# Patient Record
Sex: FEMALE | Race: WHITE | NOT HISPANIC OR LATINO | Employment: OTHER | URBAN - METROPOLITAN AREA
[De-identification: names, ages, dates, MRNs, and addresses within clinical notes are randomized per-mention and may not be internally consistent; named-entity substitution may affect disease eponyms.]

---

## 2022-07-27 LAB
CREAT ?TM UR-SCNC: 77.4 UMOL/L
EXT MICROALBUMIN URINE RANDOM: 5.3
HBA1C MFR BLD HPLC: 7.6 %
MICROALBUMIN/CREAT UR: 68.5 MG/G{CREAT}

## 2022-09-12 ENCOUNTER — OFFICE VISIT (OUTPATIENT)
Dept: ENDOCRINOLOGY | Age: 87
End: 2022-09-12
Payer: COMMERCIAL

## 2022-09-12 VITALS
TEMPERATURE: 98 F | HEART RATE: 64 BPM | WEIGHT: 181.2 LBS | OXYGEN SATURATION: 93 % | SYSTOLIC BLOOD PRESSURE: 120 MMHG | BODY MASS INDEX: 33.34 KG/M2 | HEIGHT: 62 IN | DIASTOLIC BLOOD PRESSURE: 62 MMHG

## 2022-09-12 DIAGNOSIS — M80.00XA OSTEOPOROSIS WITH CURRENT PATHOLOGICAL FRACTURE, UNSPECIFIED OSTEOPOROSIS TYPE, INITIAL ENCOUNTER: ICD-10-CM

## 2022-09-12 DIAGNOSIS — E11.65 INADEQUATELY CONTROLLED DIABETES MELLITUS (HCC): Primary | ICD-10-CM

## 2022-09-12 DIAGNOSIS — E78.2 MIXED HYPERLIPIDEMIA: ICD-10-CM

## 2022-09-12 DIAGNOSIS — I10 HYPERTENSION, UNSPECIFIED TYPE: ICD-10-CM

## 2022-09-12 DIAGNOSIS — E03.9 PRIMARY HYPOTHYROIDISM: ICD-10-CM

## 2022-09-12 DIAGNOSIS — N18.31 STAGE 3A CHRONIC KIDNEY DISEASE (HCC): ICD-10-CM

## 2022-09-12 PROCEDURE — 99214 OFFICE O/P EST MOD 30 MIN: CPT | Performed by: INTERNAL MEDICINE

## 2022-09-12 RX ORDER — ATENOLOL 25 MG/1
TABLET ORAL
COMMUNITY
Start: 2022-09-05

## 2022-09-12 RX ORDER — GLIPIZIDE 5 MG/1
5 TABLET ORAL 2 TIMES DAILY
COMMUNITY
Start: 2022-06-17

## 2022-09-12 RX ORDER — FENOFIBRATE 145 MG/1
145 TABLET, COATED ORAL DAILY
COMMUNITY
Start: 2022-08-06

## 2022-09-12 RX ORDER — LEVOTHYROXINE SODIUM 137 UG/1
137 TABLET ORAL DAILY
Qty: 90 TABLET | Refills: 2 | Status: SHIPPED | OUTPATIENT
Start: 2022-09-12 | End: 2022-12-11

## 2022-09-12 RX ORDER — ATORVASTATIN CALCIUM 80 MG/1
80 TABLET, FILM COATED ORAL DAILY
COMMUNITY
Start: 2022-08-02

## 2022-09-12 RX ORDER — LEVOTHYROXINE SODIUM 0.12 MG/1
125 TABLET ORAL DAILY
COMMUNITY
Start: 2022-07-03 | End: 2022-09-12

## 2022-09-12 RX ORDER — DAPAGLIFLOZIN 10 MG/1
10 TABLET, FILM COATED ORAL DAILY
COMMUNITY
Start: 2022-08-02

## 2022-09-12 RX ORDER — LEVOTHYROXINE SODIUM 112 UG/1
112 TABLET ORAL DAILY
COMMUNITY
Start: 2022-07-28 | End: 2022-09-12 | Stop reason: DRUGHIGH

## 2022-09-12 RX ORDER — DULAGLUTIDE 0.75 MG/.5ML
INJECTION, SOLUTION SUBCUTANEOUS WEEKLY
COMMUNITY
Start: 2022-07-25

## 2022-09-12 RX ORDER — ALLOPURINOL 100 MG/1
100 TABLET ORAL DAILY
COMMUNITY
Start: 2022-08-05

## 2022-09-12 RX ORDER — CANDESARTAN 8 MG/1
8 TABLET ORAL DAILY
COMMUNITY
Start: 2022-08-09

## 2022-09-12 NOTE — PATIENT INSTRUCTIONS
All meds same  I changed levothyroxine dose from 125 mcg daily to 137 mcg tab one daily  Need to see a kidney specialist  Return for a visit in 3 months and do blood test before coming  Bring all your meds next time
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
## 2022-10-19 ENCOUNTER — TELEPHONE (OUTPATIENT)
Dept: ENDOCRINOLOGY | Age: 87
End: 2022-10-19

## 2022-11-30 ENCOUNTER — TELEPHONE (OUTPATIENT)
Dept: NEPHROLOGY | Facility: CLINIC | Age: 87
End: 2022-11-30

## 2022-11-30 NOTE — TELEPHONE ENCOUNTER
I called and left message on patients answering machine for patient to return our call about scheduling a Nephrology [facility] Appointment Ref by Dr Natasha Luna for Stage 3a chronic kidney disease

## 2022-12-02 ENCOUNTER — TELEPHONE (OUTPATIENT)
Dept: NEPHROLOGY | Facility: CLINIC | Age: 87
End: 2022-12-02

## 2022-12-02 NOTE — TELEPHONE ENCOUNTER
I called and spoke with patient sister   Patients sister stated patient is having surgery on the 13 of December and at this time patient is unable to schedule xi with the Nephrologist  Referral will be close

## 2022-12-08 ENCOUNTER — TELEPHONE (OUTPATIENT)
Dept: ENDOCRINOLOGY | Age: 87
End: 2022-12-08

## 2022-12-08 NOTE — TELEPHONE ENCOUNTER
Patient's sister called - says patient had bloodwork done at [facility] earlier this week for surgery scheduled on Tue , 12/13  Bloodwork we ordered has not been done  I tried to call lab to see if an A1C was done - left a message

## 2022-12-08 NOTE — TELEPHONE ENCOUNTER
Left message for patient to remind her to do her bloodwork before her appt on Monday and to confirm

## 2022-12-12 ENCOUNTER — OFFICE VISIT (OUTPATIENT)
Dept: ENDOCRINOLOGY | Age: 87
End: 2022-12-12

## 2022-12-12 VITALS
OXYGEN SATURATION: 96 % | BODY MASS INDEX: 32.54 KG/M2 | HEART RATE: 67 BPM | DIASTOLIC BLOOD PRESSURE: 52 MMHG | WEIGHT: 176.8 LBS | TEMPERATURE: 97.8 F | SYSTOLIC BLOOD PRESSURE: 118 MMHG | HEIGHT: 62 IN

## 2022-12-12 DIAGNOSIS — E11.65 INADEQUATELY CONTROLLED DIABETES MELLITUS (HCC): Primary | ICD-10-CM

## 2022-12-12 DIAGNOSIS — E78.2 MIXED HYPERLIPIDEMIA: ICD-10-CM

## 2022-12-12 DIAGNOSIS — N18.31 STAGE 3A CHRONIC KIDNEY DISEASE (HCC): ICD-10-CM

## 2022-12-12 DIAGNOSIS — E03.9 PRIMARY HYPOTHYROIDISM: ICD-10-CM

## 2022-12-12 DIAGNOSIS — I10 HYPERTENSION, UNSPECIFIED TYPE: ICD-10-CM

## 2022-12-12 RX ORDER — DULAGLUTIDE 0.75 MG/.5ML
0.75 INJECTION, SOLUTION SUBCUTANEOUS WEEKLY
Qty: 6 ML | Refills: 2 | Status: SHIPPED | OUTPATIENT
Start: 2022-12-12 | End: 2023-03-12

## 2022-12-12 RX ORDER — LEVOTHYROXINE SODIUM 137 UG/1
137 TABLET ORAL DAILY
Qty: 90 TABLET | Refills: 2 | Status: SHIPPED | OUTPATIENT
Start: 2022-12-12 | End: 2023-03-12

## 2022-12-12 RX ORDER — GLIPIZIDE 5 MG/1
5 TABLET ORAL 2 TIMES DAILY
Qty: 180 TABLET | Refills: 2 | Status: SHIPPED | OUTPATIENT
Start: 2022-12-12 | End: 2023-03-12

## 2022-12-12 RX ORDER — SULFAMETHOXAZOLE AND TRIMETHOPRIM 800; 160 MG/1; MG/1
TABLET ORAL
COMMUNITY
Start: 2022-12-01 | End: 2022-12-12
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
## 2023-03-29 ENCOUNTER — APPOINTMENT (OUTPATIENT)
Dept: LAB | Facility: CLINIC | Age: 88
End: 2023-03-29

## 2023-03-29 DIAGNOSIS — N18.32 CHRONIC KIDNEY DISEASE (CKD) STAGE G3B/A1, MODERATELY DECREASED GLOMERULAR FILTRATION RATE (GFR) BETWEEN 30-44 ML/MIN/1.73 SQUARE METER AND ALBUMINURIA CREATININE RATIO LESS THAN 30 MG/G (HCC): ICD-10-CM

## 2023-03-29 DIAGNOSIS — E03.9 ACQUIRED HYPOTHYROIDISM: ICD-10-CM

## 2023-03-29 DIAGNOSIS — E11.65 INADEQUATELY CONTROLLED DIABETES MELLITUS (HCC): ICD-10-CM

## 2023-03-29 DIAGNOSIS — E11.9 TYPE 2 DIABETES MELLITUS WITHOUT COMPLICATION, WITHOUT LONG-TERM CURRENT USE OF INSULIN (HCC): ICD-10-CM

## 2023-03-29 DIAGNOSIS — M1A.00X0 CHRONIC GOUTY ARTHRITIS: ICD-10-CM

## 2023-03-29 LAB
ALBUMIN SERPL BCP-MCNC: 3.4 G/DL (ref 3.5–5)
ALP SERPL-CCNC: 45 U/L (ref 46–116)
ALT SERPL W P-5'-P-CCNC: 25 U/L (ref 12–78)
ANION GAP SERPL CALCULATED.3IONS-SCNC: 4 MMOL/L (ref 4–13)
AST SERPL W P-5'-P-CCNC: 18 U/L (ref 5–45)
BILIRUB SERPL-MCNC: 0.56 MG/DL (ref 0.2–1)
BUN SERPL-MCNC: 27 MG/DL (ref 5–25)
CALCIUM ALBUM COR SERPL-MCNC: 10.1 MG/DL (ref 8.3–10.1)
CALCIUM SERPL-MCNC: 9.6 MG/DL (ref 8.3–10.1)
CHLORIDE SERPL-SCNC: 106 MMOL/L (ref 96–108)
CHOLEST SERPL-MCNC: 102 MG/DL
CO2 SERPL-SCNC: 25 MMOL/L (ref 21–32)
CREAT SERPL-MCNC: 1.08 MG/DL (ref 0.6–1.3)
CREAT UR-MCNC: 71.5 MG/DL
EST. AVERAGE GLUCOSE BLD GHB EST-MCNC: 151 MG/DL
GFR SERPL CREATININE-BSD FRML MDRD: 44 ML/MIN/1.73SQ M
GLUCOSE P FAST SERPL-MCNC: 167 MG/DL (ref 65–99)
HBA1C MFR BLD: 6.9 %
HDLC SERPL-MCNC: 32 MG/DL
LDLC SERPL CALC-MCNC: 42 MG/DL (ref 0–100)
MICROALBUMIN UR-MCNC: 30.6 MG/L (ref 0–20)
MICROALBUMIN/CREAT 24H UR: 43 MG/G CREATININE (ref 0–30)
NONHDLC SERPL-MCNC: 70 MG/DL
POTASSIUM SERPL-SCNC: 4.2 MMOL/L (ref 3.5–5.3)
PROT SERPL-MCNC: 6.6 G/DL (ref 6.4–8.4)
SODIUM SERPL-SCNC: 135 MMOL/L (ref 135–147)
T4 FREE SERPL-MCNC: 1.52 NG/DL (ref 0.76–1.46)
TRIGL SERPL-MCNC: 141 MG/DL
TSH SERPL DL<=0.05 MIU/L-ACNC: 7.15 UIU/ML (ref 0.45–4.5)
URATE SERPL-MCNC: 4.8 MG/DL (ref 2–7.5)

## 2023-07-27 ENCOUNTER — APPOINTMENT (OUTPATIENT)
Dept: LAB | Facility: CLINIC | Age: 88
End: 2023-07-27
Payer: COMMERCIAL

## 2023-07-27 DIAGNOSIS — E03.9 PRIMARY HYPOTHYROIDISM: ICD-10-CM

## 2023-07-27 DIAGNOSIS — E11.65 INADEQUATELY CONTROLLED DIABETES MELLITUS (HCC): ICD-10-CM

## 2023-07-27 LAB
ALBUMIN SERPL BCP-MCNC: 3.3 G/DL (ref 3.5–5)
ALP SERPL-CCNC: 45 U/L (ref 46–116)
ALT SERPL W P-5'-P-CCNC: 27 U/L (ref 12–78)
ANION GAP SERPL CALCULATED.3IONS-SCNC: 4 MMOL/L
AST SERPL W P-5'-P-CCNC: 26 U/L (ref 5–45)
BILIRUB SERPL-MCNC: 0.95 MG/DL (ref 0.2–1)
BUN SERPL-MCNC: 37 MG/DL (ref 5–25)
CALCIUM ALBUM COR SERPL-MCNC: 10 MG/DL (ref 8.3–10.1)
CALCIUM SERPL-MCNC: 9.4 MG/DL (ref 8.3–10.1)
CHLORIDE SERPL-SCNC: 109 MMOL/L (ref 96–108)
CO2 SERPL-SCNC: 25 MMOL/L (ref 21–32)
CREAT SERPL-MCNC: 1.3 MG/DL (ref 0.6–1.3)
EST. AVERAGE GLUCOSE BLD GHB EST-MCNC: 192 MG/DL
GFR SERPL CREATININE-BSD FRML MDRD: 35 ML/MIN/1.73SQ M
GLUCOSE P FAST SERPL-MCNC: 227 MG/DL (ref 65–99)
HBA1C MFR BLD: 8.3 %
POTASSIUM SERPL-SCNC: 4.3 MMOL/L (ref 3.5–5.3)
PROT SERPL-MCNC: 6.6 G/DL (ref 6.4–8.4)
SODIUM SERPL-SCNC: 138 MMOL/L (ref 135–147)
TSH SERPL DL<=0.05 MIU/L-ACNC: 10.89 UIU/ML (ref 0.45–4.5)

## 2023-07-27 PROCEDURE — 84443 ASSAY THYROID STIM HORMONE: CPT

## 2023-07-27 PROCEDURE — 36415 COLL VENOUS BLD VENIPUNCTURE: CPT

## 2023-07-27 PROCEDURE — 80053 COMPREHEN METABOLIC PANEL: CPT

## 2023-07-27 PROCEDURE — 83036 HEMOGLOBIN GLYCOSYLATED A1C: CPT

## 2023-08-01 ENCOUNTER — OFFICE VISIT (OUTPATIENT)
Age: 88
End: 2023-08-01
Payer: COMMERCIAL

## 2023-08-01 VITALS
SYSTOLIC BLOOD PRESSURE: 116 MMHG | BODY MASS INDEX: 32.06 KG/M2 | WEIGHT: 174.2 LBS | HEIGHT: 62 IN | DIASTOLIC BLOOD PRESSURE: 68 MMHG

## 2023-08-01 DIAGNOSIS — E03.9 PRIMARY HYPOTHYROIDISM: ICD-10-CM

## 2023-08-01 DIAGNOSIS — E11.65 INADEQUATELY CONTROLLED DIABETES MELLITUS (HCC): Primary | ICD-10-CM

## 2023-08-01 PROCEDURE — 99214 OFFICE O/P EST MOD 30 MIN: CPT | Performed by: INTERNAL MEDICINE

## 2023-08-01 RX ORDER — DAPAGLIFLOZIN 10 MG/1
10 TABLET, FILM COATED ORAL DAILY
COMMUNITY
Start: 2023-07-26

## 2023-08-01 RX ORDER — LEVOTHYROXINE SODIUM 137 UG/1
TABLET ORAL
Qty: 90 TABLET | Refills: 2 | Status: SHIPPED | OUTPATIENT
Start: 2023-08-01

## 2023-08-01 RX ORDER — LEVOTHYROXINE SODIUM 0.15 MG/1
TABLET ORAL
Qty: 90 TABLET | Refills: 1 | Status: SHIPPED | OUTPATIENT
Start: 2023-08-01
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
## 2023-09-11 DIAGNOSIS — E11.65 INADEQUATELY CONTROLLED DIABETES MELLITUS (HCC): ICD-10-CM

## 2023-09-17 RX ORDER — GLIPIZIDE 5 MG/1
5 TABLET ORAL
Qty: 180 TABLET | Refills: 0 | Status: SHIPPED | OUTPATIENT
Start: 2023-09-17

## 2023-11-08 ENCOUNTER — APPOINTMENT (OUTPATIENT)
Dept: LAB | Facility: CLINIC | Age: 88
End: 2023-11-08
Payer: COMMERCIAL

## 2023-11-08 DIAGNOSIS — E11.65 INADEQUATELY CONTROLLED DIABETES MELLITUS (HCC): ICD-10-CM

## 2023-11-08 DIAGNOSIS — E03.9 PRIMARY HYPOTHYROIDISM: ICD-10-CM

## 2023-11-08 LAB
ANION GAP SERPL CALCULATED.3IONS-SCNC: 10 MMOL/L
BACTERIA UR QL AUTO: ABNORMAL /HPF
BILIRUB UR QL STRIP: NEGATIVE
BUN SERPL-MCNC: 33 MG/DL (ref 5–25)
CALCIUM SERPL-MCNC: 9.5 MG/DL (ref 8.4–10.2)
CHLORIDE SERPL-SCNC: 101 MMOL/L (ref 96–108)
CLARITY UR: ABNORMAL
CO2 SERPL-SCNC: 24 MMOL/L (ref 21–32)
COLOR UR: ABNORMAL
CREAT SERPL-MCNC: 1.16 MG/DL (ref 0.6–1.3)
EST. AVERAGE GLUCOSE BLD GHB EST-MCNC: 183 MG/DL
GFR SERPL CREATININE-BSD FRML MDRD: 40 ML/MIN/1.73SQ M
GLUCOSE P FAST SERPL-MCNC: 177 MG/DL (ref 65–99)
GLUCOSE UR STRIP-MCNC: ABNORMAL MG/DL
HBA1C MFR BLD: 8 %
HGB UR QL STRIP.AUTO: ABNORMAL
KETONES UR STRIP-MCNC: NEGATIVE MG/DL
LEUKOCYTE ESTERASE UR QL STRIP: ABNORMAL
NITRITE UR QL STRIP: NEGATIVE
NON-SQ EPI CELLS URNS QL MICRO: ABNORMAL /HPF
PH UR STRIP.AUTO: 5.5 [PH]
POTASSIUM SERPL-SCNC: 4.9 MMOL/L (ref 3.5–5.3)
PROT UR STRIP-MCNC: ABNORMAL MG/DL
RBC #/AREA URNS AUTO: ABNORMAL /HPF
SODIUM SERPL-SCNC: 135 MMOL/L (ref 135–147)
SP GR UR STRIP.AUTO: 1.01 (ref 1–1.03)
TSH SERPL DL<=0.05 MIU/L-ACNC: 8.2 UIU/ML (ref 0.45–4.5)
UROBILINOGEN UR STRIP-ACNC: <2 MG/DL
WBC #/AREA URNS AUTO: ABNORMAL /HPF

## 2023-11-08 PROCEDURE — 84443 ASSAY THYROID STIM HORMONE: CPT

## 2023-11-08 PROCEDURE — 83036 HEMOGLOBIN GLYCOSYLATED A1C: CPT

## 2023-11-08 PROCEDURE — 81001 URINALYSIS AUTO W/SCOPE: CPT

## 2023-11-08 PROCEDURE — 80048 BASIC METABOLIC PNL TOTAL CA: CPT

## 2023-11-08 PROCEDURE — 36415 COLL VENOUS BLD VENIPUNCTURE: CPT

## 2023-11-14 ENCOUNTER — TELEPHONE (OUTPATIENT)
Age: 88
End: 2023-11-14

## 2023-11-14 NOTE — TELEPHONE ENCOUNTER
----- Message from Radha Trevizo MD sent at 11/12/2023  4:24 PM EST -----  Urgent . Please call her and find out whether she is being treated for UTI or not?  I believe her PCP has ordered her UA .  ----- Message -----  From: Lab, Background User  Sent: 11/8/2023   4:41 PM EST  To: Radha Trevizo MD

## 2023-12-05 ENCOUNTER — OFFICE VISIT (OUTPATIENT)
Age: 88
End: 2023-12-05
Payer: COMMERCIAL

## 2023-12-05 ENCOUNTER — APPOINTMENT (OUTPATIENT)
Age: 88
End: 2023-12-05
Payer: COMMERCIAL

## 2023-12-05 VITALS
WEIGHT: 174 LBS | HEIGHT: 62 IN | DIASTOLIC BLOOD PRESSURE: 60 MMHG | BODY MASS INDEX: 32.02 KG/M2 | SYSTOLIC BLOOD PRESSURE: 134 MMHG

## 2023-12-05 DIAGNOSIS — R82.81 PYURIA: ICD-10-CM

## 2023-12-05 DIAGNOSIS — E03.9 PRIMARY HYPOTHYROIDISM: ICD-10-CM

## 2023-12-05 DIAGNOSIS — E11.65 INADEQUATELY CONTROLLED DIABETES MELLITUS (HCC): Primary | ICD-10-CM

## 2023-12-05 PROCEDURE — 87086 URINE CULTURE/COLONY COUNT: CPT

## 2023-12-05 PROCEDURE — 87077 CULTURE AEROBIC IDENTIFY: CPT

## 2023-12-05 PROCEDURE — 87186 SC STD MICRODIL/AGAR DIL: CPT

## 2023-12-05 PROCEDURE — 99214 OFFICE O/P EST MOD 30 MIN: CPT | Performed by: INTERNAL MEDICINE

## 2023-12-05 RX ORDER — LEVOTHYROXINE SODIUM 0.15 MG/1
TABLET ORAL
Qty: 90 TABLET | Refills: 1 | Status: SHIPPED | OUTPATIENT
Start: 2023-12-05 | End: 2023-12-05 | Stop reason: SDUPTHER

## 2023-12-05 RX ORDER — LEVOTHYROXINE SODIUM 137 UG/1
TABLET ORAL
Qty: 90 TABLET | Refills: 2 | Status: SHIPPED | OUTPATIENT
Start: 2023-12-05 | End: 2023-12-05 | Stop reason: SDUPTHER

## 2023-12-05 RX ORDER — LEVOTHYROXINE SODIUM 0.15 MG/1
TABLET ORAL
Qty: 90 TABLET | Refills: 1 | Status: SHIPPED | OUTPATIENT
Start: 2023-12-05

## 2023-12-05 RX ORDER — LEVOTHYROXINE SODIUM 137 UG/1
TABLET ORAL
Qty: 90 TABLET | Refills: 2 | Status: SHIPPED | OUTPATIENT
Start: 2023-12-05

## 2023-12-05 RX ORDER — DULAGLUTIDE 0.75 MG/.5ML
0.75 INJECTION, SOLUTION SUBCUTANEOUS WEEKLY
Qty: 6 ML | Refills: 0 | Status: SHIPPED | OUTPATIENT
Start: 2023-12-05 | End: 2024-03-04
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
## 2023-12-07 LAB
BACTERIA UR CULT: ABNORMAL
BACTERIA UR CULT: ABNORMAL

## 2023-12-11 ENCOUNTER — TELEPHONE (OUTPATIENT)
Age: 88
End: 2023-12-11

## 2023-12-11 DIAGNOSIS — E11.65 INADEQUATELY CONTROLLED DIABETES MELLITUS (HCC): ICD-10-CM

## 2023-12-11 NOTE — TELEPHONE ENCOUNTER
Patient's sister called regarding urine culture results. Per Dr. Lisa Ba note informed to follow up with PCP.

## 2023-12-12 RX ORDER — DULAGLUTIDE 0.75 MG/.5ML
0.75 INJECTION, SOLUTION SUBCUTANEOUS WEEKLY
Qty: 6 ML | Refills: 0 | Status: SHIPPED | OUTPATIENT
Start: 2023-12-12 | End: 2024-03-11

## 2024-04-19 NOTE — PROGRESS NOTES
"Assessment/Plan:    Mixed hyperlipidemia  Continue Atorvastatin 80 mg daily and Fenofibrate 145 mg daily. Lipid panel reviewed.    Stage 3a chronic kidney disease (HCC)  Continue to monitor eGFR. No nephrotoxic agents on board from endocrinology stand point.    Lab Results   Component Value Date    EGFR 46 (L) 12/06/2023    EGFR 40 11/08/2023    EGFR 49 (L) 08/23/2023    CREATININE 1.10 12/06/2023    CREATININE 1.16 11/08/2023    CREATININE 1.06 08/23/2023       Primary hypothyroidism  Due for repeat labs- will do today and then prior to next visit. Presents clinically euthyroid at this time. Continue Levothyroxine Monday-Saturday 150 mcg and Sunday 137 mcg.      Latest Reference Range & Units 12/06/23 10:15   TSH, POC 0.45 - 5.33 uIU/mL 2.76 (E)   (E): External lab result    Inadequately controlled diabetes mellitus (HCC)  A1c too tightly controlled for age. Occasional episodes of hypoglycemia, mostly found immediately after eating dinner, with out awareness- unclear if this is true or glucometer error. Patient reports feeling shaky when she has hypoglycemia and sister May reports that she is \"off\" in mentation - these events are infrequent. Patient admits to skipping lunch some days. Per patient and May, dinner is the most consistent meal. We will decrease glipizide to 5 mg with dinner at this time and patient will send in glucose logs in 2 weeks for review. Discussed that we may see higher blood glucose levels which is acceptable and expected. She is to call the office if she is seeing numbers below 70 mg/dL or persistently over 250 mg/dL. She will check at various times of the day. Upon review or blood glucose logs, will consider discontinuing Glipizide all together due to hypoglycemia risks- patient resistant to this plan at this time despite education.    Continue Trulicity 0.75 mg weekly. Tolerating well.     Discussed symptoms and treatment of hypoglycemia. Reviewed risks associated with hypoglycemia " including brain death. Always carry rapid acting carbohydrates and a glucometer (a way to check your blood sugar).    Labs prior to next visit.  Lab Results   Component Value Date    HGBA1C 6.8 (H) 2023          Diagnoses and all orders for this visit:    Inadequately controlled diabetes mellitus (HCC)  -     POCT hemoglobin A1c  -     Discontinue: glipiZIDE (GLUCOTROL) 5 mg tablet; Take 1 tablet (5 mg total) by mouth daily before dinner  -     Trulicity 0.75 MG/0.5ML injection; Inject 0.5 mL (0.75 mg total) under the skin once a week As directed  -     Hemoglobin A1C; Future  -     Comprehensive metabolic panel; Future  -     glipiZIDE (GLUCOTROL XL) 5 mg 24 hr tablet; Take 1 tablet (5 mg total) by mouth daily    Primary hypothyroidism  -     TSH, 3rd generation with Free T4 reflex; Future  -     TSH, 3rd generation with Free T4 reflex    Stage 3a chronic kidney disease (HCC)  -     Comprehensive metabolic panel; Future    Mixed hyperlipidemia  -     fenofibrate (TRICOR) 145 mg tablet; Take 1 tablet (145 mg total) by mouth daily    Dyslipidemia  -     atorvastatin (LIPITOR) 80 mg tablet; Take 1 tablet (80 mg total) by mouth daily          Subjective:      Patient ID: Genia Call is a 89 y.o. female.    Genia Call is a 89 y.o. female who presents to the office today for follow-up of hypothyroidism and type 2 Diabetes, without long term insulin use. She is accompanied by her sister May who is providing much of the history.    Reports severe COVID-19 infection in 2024.  Reports episodes of severe hypoglycemia or severe hyperglycemia.    Current home glucose monitoring includes SMBG twice daily  Fastin - 150 mg/dL; after dinner ( immediately ) 70- 180 mg/dL .    Current Regimen:   Glipizide 5 mg twice daily  Trulicity 0.75 mg weekly    Thyroid Disorder: Hypothyroidism, taking levothyroxine 150 mcg Monday-Friday and 137 mcg on Saturday/.      The following portions of the patient's history were  "reviewed and updated as appropriate: allergies, current medications, past family history, past medical history, past social history, past surgical history, and problem list.    Review of Systems   Constitutional:  Negative for activity change, chills, fatigue and fever.   HENT:  Negative for congestion, ear pain, hearing loss and sore throat.    Eyes:  Negative for pain and visual disturbance.   Respiratory:  Negative for cough and shortness of breath.    Cardiovascular:  Negative for chest pain and palpitations.   Gastrointestinal:  Negative for abdominal pain, diarrhea, nausea and vomiting.   Endocrine: Negative for cold intolerance, heat intolerance, polydipsia and polyuria.   Genitourinary:  Negative for dysuria and hematuria.   Musculoskeletal:  Negative for arthralgias and back pain.   Skin:  Negative for color change and rash.   Neurological:  Negative for seizures, syncope and headaches.   Psychiatric/Behavioral:  The patient is not nervous/anxious.    All other systems reviewed and are negative.        Objective:    /72 (BP Location: Left arm, Patient Position: Sitting, Cuff Size: Large)   Pulse 72   Ht 5' 2\" (1.575 m)   SpO2 98%   BMI 31.83 kg/m²        Physical Exam  Constitutional:       Appearance: Normal appearance.   HENT:      Head: Normocephalic.      Mouth/Throat:      Mouth: Mucous membranes are moist.   Pulmonary:      Effort: Pulmonary effort is normal.   Abdominal:      General: There is no distension.   Musculoskeletal:         General: Normal range of motion.      Cervical back: Normal range of motion.   Skin:     General: Skin is warm and dry.   Neurological:      General: No focal deficit present.      Mental Status: She is alert and oriented to person, place, and time.   Psychiatric:         Mood and Affect: Mood normal.         Behavior: Behavior normal.         Thought Content: Thought content normal.           "

## 2024-04-23 ENCOUNTER — OFFICE VISIT (OUTPATIENT)
Age: 89
End: 2024-04-23
Payer: COMMERCIAL

## 2024-04-23 ENCOUNTER — APPOINTMENT (OUTPATIENT)
Age: 89
End: 2024-04-23
Payer: COMMERCIAL

## 2024-04-23 VITALS
OXYGEN SATURATION: 98 % | HEART RATE: 72 BPM | SYSTOLIC BLOOD PRESSURE: 132 MMHG | HEIGHT: 62 IN | DIASTOLIC BLOOD PRESSURE: 72 MMHG | BODY MASS INDEX: 31.83 KG/M2

## 2024-04-23 DIAGNOSIS — E78.5 DYSLIPIDEMIA: ICD-10-CM

## 2024-04-23 DIAGNOSIS — E03.9 PRIMARY HYPOTHYROIDISM: ICD-10-CM

## 2024-04-23 DIAGNOSIS — E78.2 MIXED HYPERLIPIDEMIA: ICD-10-CM

## 2024-04-23 DIAGNOSIS — N18.31 STAGE 3A CHRONIC KIDNEY DISEASE (HCC): ICD-10-CM

## 2024-04-23 DIAGNOSIS — E11.65 INADEQUATELY CONTROLLED DIABETES MELLITUS (HCC): Primary | ICD-10-CM

## 2024-04-23 LAB
SL AMB POCT HEMOGLOBIN AIC: 6.4 (ref ?–6.5)
T4 FREE SERPL-MCNC: 1.54 NG/DL (ref 0.61–1.12)
TSH SERPL DL<=0.05 MIU/L-ACNC: 0.31 UIU/ML (ref 0.45–4.5)

## 2024-04-23 PROCEDURE — 99214 OFFICE O/P EST MOD 30 MIN: CPT

## 2024-04-23 PROCEDURE — 36415 COLL VENOUS BLD VENIPUNCTURE: CPT

## 2024-04-23 PROCEDURE — 83036 HEMOGLOBIN GLYCOSYLATED A1C: CPT

## 2024-04-23 PROCEDURE — 84439 ASSAY OF FREE THYROXINE: CPT

## 2024-04-23 PROCEDURE — 84443 ASSAY THYROID STIM HORMONE: CPT

## 2024-04-23 RX ORDER — ATORVASTATIN CALCIUM 80 MG/1
80 TABLET, FILM COATED ORAL DAILY
Qty: 90 TABLET | Refills: 2 | Status: SHIPPED | OUTPATIENT
Start: 2024-04-23

## 2024-04-23 RX ORDER — FENOFIBRATE 145 MG/1
145 TABLET, COATED ORAL DAILY
Qty: 90 TABLET | Refills: 1 | Status: SHIPPED | OUTPATIENT
Start: 2024-04-23

## 2024-04-23 RX ORDER — DULAGLUTIDE 0.75 MG/.5ML
0.75 INJECTION, SOLUTION SUBCUTANEOUS WEEKLY
Qty: 6 ML | Refills: 1 | Status: SHIPPED | OUTPATIENT
Start: 2024-04-23

## 2024-04-23 RX ORDER — GLIPIZIDE 5 MG/1
5 TABLET, FILM COATED, EXTENDED RELEASE ORAL DAILY
Qty: 90 TABLET | Refills: 1 | Status: SHIPPED | OUTPATIENT
Start: 2024-04-23

## 2024-04-23 RX ORDER — GLIPIZIDE 5 MG/1
5 TABLET ORAL
Qty: 90 TABLET | Refills: 1 | Status: SHIPPED | OUTPATIENT
Start: 2024-04-23 | End: 2024-04-23

## 2024-04-23 NOTE — ASSESSMENT & PLAN NOTE
Due for repeat labs- will do today and then prior to next visit. Presents clinically euthyroid at this time. Continue Levothyroxine Monday-Saturday 150 mcg and Sunday 137 mcg.      Latest Reference Range & Units 12/06/23 10:15   TSH, POC 0.45 - 5.33 uIU/mL 2.76 (E)   (E): External lab result

## 2024-04-23 NOTE — ASSESSMENT & PLAN NOTE
Continue to monitor eGFR. No nephrotoxic agents on board from endocrinology stand point.    Lab Results   Component Value Date    EGFR 46 (L) 12/06/2023    EGFR 40 11/08/2023    EGFR 49 (L) 08/23/2023    CREATININE 1.10 12/06/2023    CREATININE 1.16 11/08/2023    CREATININE 1.06 08/23/2023

## 2024-04-23 NOTE — ASSESSMENT & PLAN NOTE
"A1c too tightly controlled for age. Occasional episodes of hypoglycemia, mostly found immediately after eating dinner, with out awareness- unclear if this is true or glucometer error. Patient reports feeling shaky when she has hypoglycemia and sister May reports that she is \"off\" in mentation - these events are infrequent. Patient admits to skipping lunch some days. Per patient and May, dinner is the most consistent meal. We will decrease glipizide to 5 mg with dinner at this time and patient will send in glucose logs in 2 weeks for review. Discussed that we may see higher blood glucose levels which is acceptable and expected. She is to call the office if she is seeing numbers below 70 mg/dL or persistently over 250 mg/dL. She will check at various times of the day. Upon review or blood glucose logs, will consider discontinuing Glipizide all together due to hypoglycemia risks- patient resistant to this plan at this time despite education.    Continue Trulicity 0.75 mg weekly. Tolerating well.     Discussed symptoms and treatment of hypoglycemia. Reviewed risks associated with hypoglycemia including brain death. Always carry rapid acting carbohydrates and a glucometer (a way to check your blood sugar).    Labs prior to next visit.  Lab Results   Component Value Date    HGBA1C 6.8 (H) 12/06/2023     "

## 2024-04-24 DIAGNOSIS — E03.9 PRIMARY HYPOTHYROIDISM: ICD-10-CM

## 2024-04-24 RX ORDER — LEVOTHYROXINE SODIUM 0.15 MG/1
150 TABLET ORAL DAILY
Qty: 78 TABLET | Refills: 1 | Status: SHIPPED | OUTPATIENT
Start: 2024-04-24

## 2024-04-25 ENCOUNTER — TELEPHONE (OUTPATIENT)
Age: 89
End: 2024-04-25

## 2024-04-25 NOTE — TELEPHONE ENCOUNTER
Called patient - spoke to her sister.  Relayed Fely's message.  Take 150mcg for 6 days M-S and nothing on Sunday  Repeat labs in 4-6 weeks.    Sister repeated message back - understands.

## 2024-04-25 NOTE — TELEPHONE ENCOUNTER
----- Message from NELY David sent at 4/24/2024  8:17 AM EDT -----  TSH is suppressed and Free T4 is elevated suggesting that dose of Levothyroxine is too high. Current prescription is Levothyroxine 150 mcg Monday-Saturday and 137 mcg on Sunday for total daily dose of 148 mcg. Stop Sunday's 137 mcg tablet. Continue Levothyroxine 150 mcg x 6 days for total daily dose of 128 mcg. Repeat Thyroid labs in 4-6 weeks.    Levothyroxine should be taken on an empty stomach, with only water. Please wait approximately 30-60 minutes before consuming anything else besides water.  Medications like iron, calcium, tums, or antacids should be taken approximately 4 hrs apart from Levothyroxine as they may decrease absorption of Levothyroxine.

## 2024-04-26 LAB
DME PARACHUTE DELIVERY DATE ACTUAL: NORMAL
DME PARACHUTE DELIVERY DATE REQUESTED: NORMAL
DME PARACHUTE ITEM DESCRIPTION: NORMAL
DME PARACHUTE ORDER STATUS: NORMAL
DME PARACHUTE SUPPLIER NAME: NORMAL
DME PARACHUTE SUPPLIER PHONE: NORMAL

## 2024-05-14 ENCOUNTER — TELEPHONE (OUTPATIENT)
Age: 89
End: 2024-05-14

## 2024-09-05 DIAGNOSIS — E03.9 PRIMARY HYPOTHYROIDISM: ICD-10-CM

## 2024-09-06 RX ORDER — LEVOTHYROXINE SODIUM 150 UG/1
TABLET ORAL
Qty: 78 TABLET | Refills: 1 | Status: SHIPPED | OUTPATIENT
Start: 2024-09-06

## 2024-10-16 ENCOUNTER — APPOINTMENT (OUTPATIENT)
Dept: LAB | Facility: CLINIC | Age: 89
End: 2024-10-16
Payer: COMMERCIAL

## 2024-10-16 DIAGNOSIS — E11.65 INADEQUATELY CONTROLLED DIABETES MELLITUS (HCC): ICD-10-CM

## 2024-10-16 DIAGNOSIS — N18.31 STAGE 3A CHRONIC KIDNEY DISEASE (HCC): ICD-10-CM

## 2024-10-16 DIAGNOSIS — E03.9 PRIMARY HYPOTHYROIDISM: ICD-10-CM

## 2024-10-16 LAB
ALBUMIN SERPL BCG-MCNC: 3.8 G/DL (ref 3.5–5)
ALP SERPL-CCNC: 43 U/L (ref 34–104)
ALT SERPL W P-5'-P-CCNC: 22 U/L (ref 7–52)
ANION GAP SERPL CALCULATED.3IONS-SCNC: 10 MMOL/L (ref 4–13)
AST SERPL W P-5'-P-CCNC: 21 U/L (ref 13–39)
BILIRUB SERPL-MCNC: 0.69 MG/DL (ref 0.2–1)
BUN SERPL-MCNC: 26 MG/DL (ref 5–25)
CALCIUM SERPL-MCNC: 9.6 MG/DL (ref 8.4–10.2)
CHLORIDE SERPL-SCNC: 102 MMOL/L (ref 96–108)
CO2 SERPL-SCNC: 28 MMOL/L (ref 21–32)
CREAT SERPL-MCNC: 1.08 MG/DL (ref 0.6–1.3)
EST. AVERAGE GLUCOSE BLD GHB EST-MCNC: 148 MG/DL
GFR SERPL CREATININE-BSD FRML MDRD: 43 ML/MIN/1.73SQ M
GLUCOSE P FAST SERPL-MCNC: 177 MG/DL (ref 65–99)
HBA1C MFR BLD: 6.8 %
POTASSIUM SERPL-SCNC: 4.3 MMOL/L (ref 3.5–5.3)
PROT SERPL-MCNC: 6.5 G/DL (ref 6.4–8.4)
SODIUM SERPL-SCNC: 140 MMOL/L (ref 135–147)
TSH SERPL DL<=0.05 MIU/L-ACNC: 1.02 UIU/ML (ref 0.45–4.5)

## 2024-10-16 PROCEDURE — 80053 COMPREHEN METABOLIC PANEL: CPT

## 2024-10-16 PROCEDURE — 84443 ASSAY THYROID STIM HORMONE: CPT

## 2024-10-16 PROCEDURE — 83036 HEMOGLOBIN GLYCOSYLATED A1C: CPT

## 2024-10-16 PROCEDURE — 36415 COLL VENOUS BLD VENIPUNCTURE: CPT

## 2024-10-17 ENCOUNTER — OFFICE VISIT (OUTPATIENT)
Age: 89
End: 2024-10-17
Payer: COMMERCIAL

## 2024-10-17 VITALS
DIASTOLIC BLOOD PRESSURE: 68 MMHG | TEMPERATURE: 97.7 F | HEART RATE: 94 BPM | BODY MASS INDEX: 31.83 KG/M2 | HEIGHT: 62 IN | SYSTOLIC BLOOD PRESSURE: 110 MMHG | OXYGEN SATURATION: 96 %

## 2024-10-17 DIAGNOSIS — N18.32 TYPE 2 DIABETES MELLITUS WITH STAGE 3B CHRONIC KIDNEY DISEASE, WITHOUT LONG-TERM CURRENT USE OF INSULIN (HCC): Primary | ICD-10-CM

## 2024-10-17 DIAGNOSIS — E11.22 TYPE 2 DIABETES MELLITUS WITH STAGE 3B CHRONIC KIDNEY DISEASE, WITHOUT LONG-TERM CURRENT USE OF INSULIN (HCC): Primary | ICD-10-CM

## 2024-10-17 DIAGNOSIS — E78.2 MIXED HYPERLIPIDEMIA: ICD-10-CM

## 2024-10-17 DIAGNOSIS — I10 HYPERTENSION, UNSPECIFIED TYPE: ICD-10-CM

## 2024-10-17 DIAGNOSIS — E03.9 PRIMARY HYPOTHYROIDISM: ICD-10-CM

## 2024-10-17 PROCEDURE — 99214 OFFICE O/P EST MOD 30 MIN: CPT

## 2024-10-17 RX ORDER — FUROSEMIDE 20 MG/1
20 TABLET ORAL DAILY
COMMUNITY
Start: 2024-09-05

## 2024-10-17 RX ORDER — ASPIRIN 81 MG/1
81 TABLET ORAL DAILY
COMMUNITY
Start: 2024-08-22

## 2024-10-17 RX ORDER — LEVOTHYROXINE SODIUM 150 UG/1
TABLET ORAL
Qty: 90 TABLET | Refills: 1 | Status: SHIPPED | OUTPATIENT
Start: 2024-10-17

## 2024-10-17 RX ORDER — APIXABAN 2.5 MG/1
2.5 TABLET, FILM COATED ORAL 2 TIMES DAILY
COMMUNITY

## 2024-10-17 NOTE — ASSESSMENT & PLAN NOTE
Presents clinically and biochemically euthyroid.  Continue levothyroxine 150 mcg Monday-Saturday with no tablet on Sunday.  Repeat thyroid function test prior to next visit.    Component      Latest Ref Rng 10/16/2024   TSH 3RD GENERATON      0.450 - 4.500 uIU/mL 1.021      Orders:    levothyroxine 150 mcg tablet; Take 1 tablet daily Monday-Saturday and NO tablet on Sunday    TSH, 3rd generation with Free T4 reflex; Future

## 2024-10-17 NOTE — PROGRESS NOTES
Ambulatory Visit  Name: Genia Call      : 3/26/1929      MRN: [id number]  Encounter Provider: NELY Varghese  Encounter Date: 10/17/2024   Encounter department: [facility]    Assessment & Plan  Type 2 diabetes mellitus with stage 3b chronic kidney disease, without long-term current use of insulin (HCC)  A1c too tightly controlled for her age.  Goal A1c 7.5%.    Discontinue glipizide XL 5 mg daily.  Increase Trulicity to 1.5 mg weekly.    Continue diet and lifestyle modification including attention to the amount and type of carbohydrates consumed as well as physical activity as tolerated.    Reviewed signs and symptoms of hypoglycemia as well as appropriate treatment.  Rule of 15's provided in AVS.    Follow-up in 3 months.  Labs prior to next visit.  Lab Results   Component Value Date    HGBA1C 6.8 (H) 10/16/2024       Orders:    dulaglutide (Trulicity) 1.5 MG/0.5ML injection; Inject 0.5 mL (1.5 mg total) under the skin every 7 days    Hemoglobin A1C; Future    Comprehensive metabolic panel; Future    Mixed hyperlipidemia  Continue fenofibrate 145 mg daily and atorvastatin 80 mg daily.       Hypertension, unspecified type  /68 in the office today.  Continue candesartan 8 mg daily       Primary hypothyroidism  Presents clinically and biochemically euthyroid.  Continue levothyroxine 150 mcg Monday-Saturday with no tablet on .  Repeat thyroid function test prior to next visit.    Component      Latest Ref Rng 10/16/2024   TSH 3RD GENERATON      0.450 - 4.500 uIU/mL 1.021      Orders:    levothyroxine 150 mcg tablet; Take 1 tablet daily Monday-Saturday and NO tablet on     TSH, 3rd generation with Free T4 reflex; Future      History of Present Illness     Genia Call is a 89 y.o. female who presents to the office today for follow-up of hypothyroidism and type 2 diabetes, without long term insulin use. Diabetes course has been  stable. Complications of diabetes include: CKD 3B, HLD.  Past medical history is significant for osteopenia, HTN, atrial fibrillation, gout, CAD with CABG. she was last seen in the office 4/23/2024 at which time her glipizide XL was decreased to once daily.    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  Symptoms of hypoglycemia include: Unawareness    Current home glucose monitoring:   Before breakfast: 130-150  Before dinner:      Current Medication Regimen:   Glipizide XL 5 mg daily- [facility] from [facility] taking twice daily  Trulicity 0.75 mg weekly    Diabetic Eye Exam: [facility]  Follows with Podiatry: 10/17/2024, Dr. Fermin   Influenza Vaccine: UTD  Has Thyroid Disorder: Hypothyroidism, taking: levothyroxine 150 mcg Monday-Saturday, NO TABLET on sunday  Hyperlipidemia, followed by PCP, taking: Fenofibrate 145 mg daily, atorvastatin 80 mg daily, Tolerating well with no myalgias  History of Pancreatitis: No  Medic Alert Tag: Recommended  Diabetes Education: Attended      History obtained from : patient and patient's sister May  Review of Systems   Constitutional:  Negative for chills, fever and unexpected weight change.   HENT:  Negative for congestion and sore throat.    Eyes:  Negative for pain.   Respiratory:  Negative for cough and shortness of breath.    Cardiovascular:  Negative for chest pain and palpitations.   Gastrointestinal:  Negative for abdominal pain, constipation, diarrhea, nausea and vomiting.   Endocrine: Negative for cold intolerance, heat intolerance, polydipsia and polyuria.   Musculoskeletal:  Positive for arthralgias and back pain.   Skin:  Negative for wound.   Neurological:  Negative for syncope and headaches.   Psychiatric/Behavioral:  Negative for sleep disturbance. The patient is not nervous/anxious.      Current Outpatient Medications on File Prior to Visit   Medication Sig Dispense  "Refill    allopurinol (ZYLOPRIM) 100 mg tablet Take 100 mg by mouth daily      aspirin (ECOTRIN LOW STRENGTH) 81 mg EC tablet Take 81 mg by mouth daily      atenolol (TENORMIN) 25 mg tablet TAKE 1 TABLET (25 MG TOTAL) BY MOUTH DAILY.      atorvastatin (LIPITOR) 80 mg tablet Take 1 tablet (80 mg total) by mouth daily 90 tablet 2    Eliquis 2.5 MG Take 2.5 mg by mouth 2 (two) times a day      fenofibrate (TRICOR) 145 mg tablet Take 1 tablet (145 mg total) by mouth daily 90 tablet 1    furosemide (LASIX) 20 mg tablet Take 20 mg by mouth daily      [DISCONTINUED] glipiZIDE (GLUCOTROL XL) 5 mg 24 hr tablet Take 1 tablet (5 mg total) by mouth daily 90 tablet 1    [DISCONTINUED] levothyroxine 150 mcg tablet TAKE 1 TABLET BY MOUTH IN THE MORNING DAILY MONDAY- SATURDAY. DO NOT TAKE ON SUNDAY. 78 tablet 1    [DISCONTINUED] Trulicity 0.75 MG/0.5ML injection Inject 0.5 mL (0.75 mg total) under the skin once a week As directed 6 mL 1    candesartan (ATACAND) 8 MG tablet Take 8 mg by mouth daily      Diclofenac Sodium (VOLTAREN) 1 %  (Patient not taking: Reported on 10/17/2024)       No current facility-administered medications on file prior to visit.      Social History     Tobacco Use    Smoking status: Never    Smokeless tobacco: Not on file   Vaping Use    Vaping status: Never Used   Substance and Sexual Activity    Alcohol use: Not Currently    Drug use: Never    Sexual activity: Not on file         Objective     /68 (BP Location: Right arm, Patient Position: Sitting, Cuff Size: Standard)   Pulse 94   Temp 97.7 °F (36.5 °C)   Ht 5' 2\" (1.575 m)   SpO2 96%   BMI 31.83 kg/m²     Physical Exam  Vitals reviewed.   Constitutional:       General: She is not in acute distress.     Appearance: Normal appearance. She is obese.   HENT:      Head: Normocephalic and atraumatic.      Mouth/Throat:      Mouth: Mucous membranes are moist.   Eyes:      Conjunctiva/sclera: Conjunctivae normal.   Cardiovascular:      Rate and " Rhythm: Normal rate.   Pulmonary:      Effort: Pulmonary effort is normal. No respiratory distress.   Abdominal:      Palpations: Abdomen is soft.      Tenderness: There is no abdominal tenderness.   Musculoskeletal:         General: No swelling.      Cervical back: Normal range of motion.   Skin:     General: Skin is warm and dry.      Capillary Refill: Capillary refill takes less than 2 seconds.   Neurological:      General: No focal deficit present.      Mental Status: She is alert and oriented to person, place, and time.   Psychiatric:         Mood and Affect: Mood normal.         Behavior: Behavior normal.         Thought Content: Thought content normal.       Administrative Statements   I have spent a total time of 34 minutes in caring for this patient on the day of the visit/encounter including Diagnostic results, Prognosis, Risks and benefits of tx options, Instructions for management, Patient and family education, Importance of tx compliance, Risk factor reductions, Impressions, Counseling / Coordination of care, Documenting in the medical record, Reviewing / ordering tests, medicine, procedures  , and Obtaining or reviewing history  .

## 2024-10-17 NOTE — PATIENT INSTRUCTIONS
STOP glipizide  Increase Trulicity to 1.5 mg weekly  Continue to check blood glucose levels at least once daily, at different times of the day  Call the office for blood sugars less than 70 mg/dL or persistent patterns over 250 mg/dL  Take levothyroxine 150 mcg Monday-Saturday, NO TABLET on Sunday      Low Blood Sugar  Steps to treat low blood sugar.    1. Test blood sugar if you have symptoms of low blood sugar:   Low Blood Sugar Symptoms:  o Sweaty  o Dizzy  o Rapid heartbeat  o Shaky  o Bad mood  o Hungry    2. Treat blood sugar less than 70 with 15 grams of fast-acting carbohydrate:   Examples of 15 grams Fast-Acting Carbohydrate:  o 4 oz juice/ 4 oz regular soda  o 1 tablespoon honey  o 1 pack of fun size skittles (about 15 individual skittles)  o 12 gummy bears  o 4 starburst   o 3-4 hard candies (chew)  o 3-4 glucose tablets (chew)            3.   Wait 15 minutes and test your blood sugar again         4.   If blood sugar is less than 100, repeat steps 2-3.    5.   When your blood sugar is 100 or more, eat a snack if it will be longer than one hour until your next meal. The snack should be 15 grams of carbohydrate and a protein:   Examples of snacks:  o ½ sandwich  o 6 crackers with cheese or with peanut butter  o Piece of fruit with cheese or peanut butter

## 2024-10-17 NOTE — ASSESSMENT & PLAN NOTE
A1c too tightly controlled for her age.  Goal A1c 7.5%.    Discontinue glipizide XL 5 mg daily.  Increase Trulicity to 1.5 mg weekly.    Continue diet and lifestyle modification including attention to the amount and type of carbohydrates consumed as well as physical activity as tolerated.    Reviewed signs and symptoms of hypoglycemia as well as appropriate treatment.  Rule of 15's provided in AVS.    Follow-up in 3 months.  Labs prior to next visit.  Lab Results   Component Value Date    HGBA1C 6.8 (H) 10/16/2024       Orders:    dulaglutide (Trulicity) 1.5 MG/0.5ML injection; Inject 0.5 mL (1.5 mg total) under the skin every 7 days    Hemoglobin A1C; Future    Comprehensive metabolic panel; Future

## 2025-01-01 ENCOUNTER — HOME CARE VISIT (OUTPATIENT)
Dept: HOME HEALTH SERVICES | Facility: HOME HEALTHCARE | Age: OVER 89
End: 2025-01-01
Payer: MEDICARE

## 2025-01-01 ENCOUNTER — HOME CARE VISIT (OUTPATIENT)
Dept: HOME HOSPICE | Facility: HOSPICE | Age: OVER 89
End: 2025-01-01
Payer: MEDICARE

## 2025-01-01 ENCOUNTER — DOCUMENTATION (OUTPATIENT)
Dept: OTHER | Facility: HOSPICE | Age: OVER 89
End: 2025-01-01

## 2025-01-01 ENCOUNTER — TELEPHONE (OUTPATIENT)
Dept: OTHER | Facility: OTHER | Age: OVER 89
End: 2025-01-01

## 2025-01-01 VITALS
HEART RATE: 77 BPM | RESPIRATION RATE: 9 BRPM | TEMPERATURE: 97.4 F | DIASTOLIC BLOOD PRESSURE: 55 MMHG | SYSTOLIC BLOOD PRESSURE: 82 MMHG

## 2025-01-01 VITALS
RESPIRATION RATE: 16 BRPM | DIASTOLIC BLOOD PRESSURE: 60 MMHG | HEART RATE: 106 BPM | TEMPERATURE: 96.1 F | SYSTOLIC BLOOD PRESSURE: 102 MMHG

## 2025-01-01 VITALS
HEART RATE: 70 BPM | RESPIRATION RATE: 18 BRPM | DIASTOLIC BLOOD PRESSURE: 60 MMHG | SYSTOLIC BLOOD PRESSURE: 98 MMHG | TEMPERATURE: 98 F

## 2025-01-01 VITALS
RESPIRATION RATE: 20 BRPM | HEART RATE: 98 BPM | SYSTOLIC BLOOD PRESSURE: 84 MMHG | TEMPERATURE: 97.4 F | DIASTOLIC BLOOD PRESSURE: 58 MMHG

## 2025-01-01 VITALS
RESPIRATION RATE: 12 BRPM | DIASTOLIC BLOOD PRESSURE: 64 MMHG | SYSTOLIC BLOOD PRESSURE: 95 MMHG | HEART RATE: 106 BPM | TEMPERATURE: 97.3 F

## 2025-01-01 VITALS
HEART RATE: 83 BPM | DIASTOLIC BLOOD PRESSURE: 57 MMHG | SYSTOLIC BLOOD PRESSURE: 87 MMHG | TEMPERATURE: 97.5 F | RESPIRATION RATE: 12 BRPM

## 2025-01-01 VITALS
SYSTOLIC BLOOD PRESSURE: 100 MMHG | HEART RATE: 42 BPM | RESPIRATION RATE: 12 BRPM | TEMPERATURE: 97.6 F | DIASTOLIC BLOOD PRESSURE: 62 MMHG

## 2025-01-01 DIAGNOSIS — J96.01 ACUTE RESPIRATORY FAILURE WITH HYPOXIA (HCC): ICD-10-CM

## 2025-01-01 DIAGNOSIS — Z51.5 HOSPICE CARE PATIENT: Primary | ICD-10-CM

## 2025-01-01 PROCEDURE — G0156 HHCP-SVS OF AIDE,EA 15 MIN: HCPCS

## 2025-01-01 PROCEDURE — G0299 HHS/HOSPICE OF RN EA 15 MIN: HCPCS

## 2025-01-01 PROCEDURE — G0155 HHCP-SVS OF CSW,EA 15 MIN: HCPCS

## 2025-01-01 RX ORDER — HALOPERIDOL 2 MG/ML
SOLUTION ORAL
Qty: 30 ML | Refills: 0 | Status: SHIPPED | OUTPATIENT
Start: 2025-01-01 | End: 2025-01-01

## 2025-01-16 ENCOUNTER — APPOINTMENT (OUTPATIENT)
Dept: LAB | Facility: CLINIC | Age: OVER 89
End: 2025-01-16
Payer: COMMERCIAL

## 2025-01-16 DIAGNOSIS — N18.32 TYPE 2 DIABETES MELLITUS WITH STAGE 3B CHRONIC KIDNEY DISEASE, WITHOUT LONG-TERM CURRENT USE OF INSULIN (HCC): ICD-10-CM

## 2025-01-16 DIAGNOSIS — E11.22 TYPE 2 DIABETES MELLITUS WITH STAGE 3B CHRONIC KIDNEY DISEASE, WITHOUT LONG-TERM CURRENT USE OF INSULIN (HCC): ICD-10-CM

## 2025-01-16 DIAGNOSIS — E03.9 PRIMARY HYPOTHYROIDISM: ICD-10-CM

## 2025-01-16 LAB
EST. AVERAGE GLUCOSE BLD GHB EST-MCNC: 186 MG/DL
HBA1C MFR BLD: 8.1 %
TSH SERPL DL<=0.05 MIU/L-ACNC: 1.27 UIU/ML (ref 0.45–4.5)

## 2025-01-16 PROCEDURE — 83036 HEMOGLOBIN GLYCOSYLATED A1C: CPT

## 2025-01-16 PROCEDURE — 84443 ASSAY THYROID STIM HORMONE: CPT

## 2025-01-16 PROCEDURE — 36415 COLL VENOUS BLD VENIPUNCTURE: CPT

## 2025-01-16 PROCEDURE — 80053 COMPREHEN METABOLIC PANEL: CPT

## 2025-01-17 ENCOUNTER — RESULTS FOLLOW-UP (OUTPATIENT)
Age: OVER 89
End: 2025-01-17

## 2025-01-17 LAB
ALBUMIN SERPL BCG-MCNC: 4 G/DL (ref 3.5–5)
ALP SERPL-CCNC: 31 U/L (ref 34–104)
ALT SERPL W P-5'-P-CCNC: 22 U/L (ref 7–52)
ANION GAP SERPL CALCULATED.3IONS-SCNC: 12 MMOL/L (ref 4–13)
AST SERPL W P-5'-P-CCNC: 22 U/L (ref 13–39)
BILIRUB SERPL-MCNC: 0.83 MG/DL (ref 0.2–1)
BUN SERPL-MCNC: 30 MG/DL (ref 5–25)
CALCIUM SERPL-MCNC: 9.1 MG/DL (ref 8.4–10.2)
CHLORIDE SERPL-SCNC: 102 MMOL/L (ref 96–108)
CO2 SERPL-SCNC: 26 MMOL/L (ref 21–32)
CREAT SERPL-MCNC: 0.83 MG/DL (ref 0.6–1.3)
GFR SERPL CREATININE-BSD FRML MDRD: 60 ML/MIN/1.73SQ M
GLUCOSE P FAST SERPL-MCNC: 166 MG/DL (ref 65–99)
POTASSIUM SERPL-SCNC: 4.8 MMOL/L (ref 3.5–5.3)
PROT SERPL-MCNC: 7 G/DL (ref 6.4–8.4)
SODIUM SERPL-SCNC: 140 MMOL/L (ref 135–147)

## 2025-01-27 ENCOUNTER — OFFICE VISIT (OUTPATIENT)
Age: OVER 89
End: 2025-01-27
Payer: COMMERCIAL

## 2025-01-27 VITALS
OXYGEN SATURATION: 94 % | DIASTOLIC BLOOD PRESSURE: 76 MMHG | HEART RATE: 74 BPM | TEMPERATURE: 98.1 F | BODY MASS INDEX: 31.83 KG/M2 | HEIGHT: 62 IN | SYSTOLIC BLOOD PRESSURE: 122 MMHG

## 2025-01-27 DIAGNOSIS — I10 PRIMARY HYPERTENSION: ICD-10-CM

## 2025-01-27 DIAGNOSIS — E78.2 MIXED HYPERLIPIDEMIA: ICD-10-CM

## 2025-01-27 DIAGNOSIS — E03.9 PRIMARY HYPOTHYROIDISM: ICD-10-CM

## 2025-01-27 DIAGNOSIS — N18.2 TYPE 2 DIABETES MELLITUS WITH STAGE 2 CHRONIC KIDNEY DISEASE, WITHOUT LONG-TERM CURRENT USE OF INSULIN  (HCC): Primary | ICD-10-CM

## 2025-01-27 DIAGNOSIS — E11.22 TYPE 2 DIABETES MELLITUS WITH STAGE 2 CHRONIC KIDNEY DISEASE, WITHOUT LONG-TERM CURRENT USE OF INSULIN  (HCC): Primary | ICD-10-CM

## 2025-01-27 PROCEDURE — 99214 OFFICE O/P EST MOD 30 MIN: CPT

## 2025-01-27 RX ORDER — GLIPIZIDE 2.5 MG/1
2.5 TABLET, EXTENDED RELEASE ORAL DAILY
COMMUNITY
End: 2025-01-27 | Stop reason: SDUPTHER

## 2025-01-27 RX ORDER — LEVOTHYROXINE SODIUM 150 UG/1
TABLET ORAL
Qty: 90 TABLET | Refills: 1 | Status: SHIPPED | OUTPATIENT
Start: 2025-01-27

## 2025-01-27 RX ORDER — GLIPIZIDE 2.5 MG/1
2.5 TABLET, EXTENDED RELEASE ORAL DAILY
COMMUNITY
Start: 2025-01-03 | End: 2025-01-27 | Stop reason: SDUPTHER

## 2025-01-27 RX ORDER — GLIPIZIDE 2.5 MG/1
2.5 TABLET, EXTENDED RELEASE ORAL DAILY
Qty: 30 TABLET | Refills: 11 | Status: SHIPPED | OUTPATIENT
Start: 2025-01-27 | End: 2026-01-27

## 2025-01-27 RX ORDER — AMMONIUM LACTATE 12 G/100G
LOTION TOPICAL
COMMUNITY
Start: 2025-01-09 | End: 2025-02-08

## 2025-01-27 NOTE — PATIENT INSTRUCTIONS
Continue Trulicity 1.5 mg weekly  Continue Glipizide xl 2.5 mg daily  Check blood glucose levels twice daily  Call the office for blood glucose levels less than 80 mg/dL or persistent patterns over 250 mg/dL.  Continue levothyroxine 150 mcg Monday-Saturday, NO TABLET on Sunday      Low Blood Sugar  Steps to treat low blood sugar.    1. Test blood sugar if you have symptoms of low blood sugar:   Low Blood Sugar Symptoms:  o Sweaty  o Dizzy  o Rapid heartbeat  o Shaky  o Bad mood  o Hungry    2. Treat blood sugar less than 70 with 15 grams of fast-acting carbohydrate:   Examples of 15 grams Fast-Acting Carbohydrate:  o 4 oz juice/ 4 oz regular soda  o 1 tablespoon honey  o 1 pack of fun size skittles (about 15 individual skittles)  o 12 gummy bears  o 4 starburst   o 3-4 hard candies (chew)  o 3-4 glucose tablets (chew)            3.   Wait 15 minutes and test your blood sugar again         4.   If blood sugar is less than 100, repeat steps 2-3.    5.   When your blood sugar is 100 or more, eat a snack if it will be longer than one hour until your next meal. The snack should be 15 grams of carbohydrate and a protein:   Examples of snacks:  o ½ sandwich  o 6 crackers with cheese or with peanut butter  o Piece of fruit with cheese or peanut butter

## 2025-01-27 NOTE — ASSESSMENT & PLAN NOTE
A1c worsened from previous visit.  Goal A1c 7.5-8%.    Continue Trulicity 1.5 mg weekly.  Educated on appropriate administration sites.  Continue glipizide XL 2.5 mg daily.    Reviewed risks as well as signs and symptoms of hypoglycemia.  Rule of 15's provided in AVS for appropriate treatment.  She knows to call the office for blood glucose levels less than 70 mg/dL or persistent patterns over 250 mg/dL.  Continue to check blood glucose levels twice daily.    Continue to improve diet and lifestyle including attention to the amount and type of carbohydrates consumed as well as increased physical activity as tolerated.    Follow-up in 3 months.  Labs prior to next visit  Lab Results   Component Value Date    HGBA1C 8.1 (H) 01/16/2025     Orders:  •  glipiZIDE (GLUCOTROL XL) 2.5 mg 24 hr tablet; Take 1 tablet (2.5 mg total) by mouth daily  •  Dulaglutide 1.5 MG/0.5ML SOAJ; Inject 1.5 mg under the skin once a week  •  Hemoglobin A1C; Future  •  Basic metabolic panel; Future

## 2025-01-27 NOTE — ASSESSMENT & PLAN NOTE
Presents clinically and biochemically euthyroid.  Continue levothyroxine 150 mcg daily Monday-Saturday with no tablet on Sunday.    Component      Latest Ref Rng 1/16/2025   TSH 3RD GENERATON      0.450 - 4.500 uIU/mL 1.273      Orders:  •  levothyroxine 150 mcg tablet; Take 1 tablet daily Monday-Saturday and NO tablet on Sunday  •  TSH, 3rd generation; Future  •  T4, free; Future

## 2025-01-27 NOTE — PROGRESS NOTES
Name: Genia Call      : 3/26/1929      MRN: [id number]  Encounter Provider: NELY Varghese  Encounter Date: 2025   Encounter department: [facility]  :  Assessment & Plan  Type 2 diabetes mellitus with stage 2 chronic kidney disease, without long-term current use of insulin  (HCC)  A1c worsened from previous visit.  Goal A1c 7.5-8%.    Continue Trulicity 1.5 mg weekly.  Educated on appropriate administration sites.  Continue glipizide XL 2.5 mg daily.    Reviewed risks as well as signs and symptoms of hypoglycemia.  Rule of 15's provided in AVS for appropriate treatment.  She knows to call the office for blood glucose levels less than 70 mg/dL or persistent patterns over 250 mg/dL.  Continue to check blood glucose levels twice daily.    Continue to improve diet and lifestyle including attention to the amount and type of carbohydrates consumed as well as increased physical activity as tolerated.    Follow-up in 3 months.  Labs prior to next visit  Lab Results   Component Value Date    HGBA1C 8.1 (H) 2025     Orders:  •  glipiZIDE (GLUCOTROL XL) 2.5 mg 24 hr tablet; Take 1 tablet (2.5 mg total) by mouth daily  •  Dulaglutide 1.5 MG/0.5ML SOAJ; Inject 1.5 mg under the skin once a week  •  Hemoglobin A1C; Future  •  Basic metabolic panel; Future    Primary hypertension  /76 in the office today.  Continue current regimen including candesartan 8 mg daily.       Mixed hyperlipidemia  Continue atorvastatin 80 mg daily.       Primary hypothyroidism  Presents clinically and biochemically euthyroid.  Continue levothyroxine 150 mcg daily Monday-Saturday with no tablet on .    Component      Latest Ref Rng 2025   TSH 3RD GENERATON      0.450 - 4.500 uIU/mL 1.273      Orders:  •  levothyroxine 150 mcg tablet; Take 1 tablet daily Monday-Saturday and NO tablet on   •  TSH, 3rd generation; Future  •  T4, free;  Future        History of Present Illness   HPI  Genia Call is a 89 y.o. female who presents  to the office today for routine follow-up of hypothyroidism and type 2 diabetes, without long term insulin use. Diabetes course has been stable. Complications of diabetes include: CKD 2, HLD.  Past medical history is significant for osteopenia, HTN, atrial fibrillation, gout, CAD with CABG. She was last seen in the office 10/17/2024 at which time her A1c was 6.8%, sulfonylurea was discontinued, and GLP-1 was increased.  Her most recent A1c is 8.1%.     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  Symptoms of hypoglycemia include: Unawareness     Current home glucose monitoring:   Before breakfast: 138-226  Before dinner: 109-190     Current Medication Regimen:   Trulicity 1.5 mg weekly  Glipizide XL 2.5 mg weekly - restarted by PCP    Diabetic Eye Exam: UTD, [facility]  Follows with Podiatry: 10/17/2024, Dr. Fermin   Influenza Vaccine: UTD  Has Thyroid Disorder: Hypothyroidism, taking: levothyroxine 150 mcg Monday-Saturday, NO TABLET on Sunday  Hypertension, taking: Candesartan 8 mg daily  Hyperlipidemia, taking: Fenofibrate 145 mg daily, atorvastatin 80 mg daily, Tolerating well with no myalgias  History of Pancreatitis: No  Medic Alert Tag: Recommended  Diabetes Education: Attended       History obtained from: patient    Review of Systems   Constitutional:  Negative for chills, fever and unexpected weight change.   HENT:  Negative for congestion and sore throat.    Eyes:  Negative for visual disturbance.   Respiratory:  Negative for cough and shortness of breath.    Cardiovascular:  Negative for chest pain and palpitations.   Gastrointestinal:  Negative for abdominal pain, constipation, diarrhea, nausea and vomiting.   Endocrine: Negative for cold intolerance, heat intolerance, polydipsia and polyuria.   Musculoskeletal:  Positive  for gait problem.   Skin:  Negative for wound.   Neurological:  Negative for dizziness.   Psychiatric/Behavioral:  Negative for sleep disturbance. The patient is not nervous/anxious.      Current Outpatient Medications on File Prior to Visit   Medication Sig Dispense Refill   • allopurinol (ZYLOPRIM) 100 mg tablet Take 100 mg by mouth daily     • ammonium lactate (LAC-HYDRIN) 12 % lotion Apply topically     • aspirin (ECOTRIN LOW STRENGTH) 81 mg EC tablet Take 81 mg by mouth daily     • atenolol (TENORMIN) 25 mg tablet TAKE 1 TABLET (25 MG TOTAL) BY MOUTH DAILY.     • atorvastatin (LIPITOR) 80 mg tablet Take 1 tablet (80 mg total) by mouth daily 90 tablet 2   • candesartan (ATACAND) 8 MG tablet Take 8 mg by mouth daily     • Eliquis 2.5 MG Take 2.5 mg by mouth 2 (two) times a day     • fenofibrate (TRICOR) 145 mg tablet Take 1 tablet (145 mg total) by mouth daily 90 tablet 1   • furosemide (LASIX) 20 mg tablet Take 20 mg by mouth daily     • [DISCONTINUED] dulaglutide (Trulicity) 1.5 MG/0.5ML injection Inject 0.5 mL (1.5 mg total) under the skin every 7 days 2 mL 2   • [DISCONTINUED] glipiZIDE (GLUCOTROL XL) 2.5 mg 24 hr tablet Take 2.5 mg by mouth daily     • [DISCONTINUED] glipiZIDE (GLUCOTROL XL) 2.5 mg 24 hr tablet Take 2.5 mg by mouth daily     • [DISCONTINUED] levothyroxine 150 mcg tablet Take 1 tablet daily Monday-Saturday and NO tablet on Sunday 90 tablet 1   • Diclofenac Sodium (VOLTAREN) 1 %  (Patient not taking: Reported on 10/17/2024)       No current facility-administered medications on file prior to visit.      Social History     Tobacco Use   • Smoking status: Never   • Smokeless tobacco: Not on file   Vaping Use   • Vaping status: Never Used   Substance and Sexual Activity   • Alcohol use: Not Currently   • Drug use: Never   • Sexual activity: Not on file      -  Objective   /76 (BP Location: Right arm, Patient Position: Sitting, Cuff Size: Standard)   Pulse 74   Temp 98.1 °F (36.7 °C)   Ht  "5' 2\" (1.575 m)   SpO2 94%   BMI 31.83 kg/m²      Physical Exam  Vitals reviewed.   Constitutional:       General: She is not in acute distress.     Appearance: Normal appearance. She is well-groomed.   HENT:      Head: Normocephalic and atraumatic.      Mouth/Throat:      Mouth: Mucous membranes are moist.   Eyes:      Conjunctiva/sclera: Conjunctivae normal.   Cardiovascular:      Rate and Rhythm: Normal rate.   Pulmonary:      Effort: Pulmonary effort is normal. No respiratory distress.   Abdominal:      General: There is no distension.      Palpations: Abdomen is soft.   Musculoskeletal:         General: No swelling.      Cervical back: Normal range of motion.   Skin:     General: Skin is warm and dry.      Capillary Refill: Capillary refill takes less than 2 seconds.   Neurological:      General: No focal deficit present.      Mental Status: She is alert and oriented to person, place, and time.   Psychiatric:         Mood and Affect: Mood normal.         Behavior: Behavior normal. Behavior is cooperative.         Thought Content: Thought content normal.         Judgment: Judgment normal.         Administrative Statements   I have spent a total time of 35 minutes in caring for this patient on the day of the visit/encounter including Diagnostic results, Prognosis, Risks and benefits of tx options, Instructions for management, Patient and family education, Importance of tx compliance, Risk factor reductions, Impressions, Counseling / Coordination of care, Documenting in the medical record, Reviewing / ordering tests, medicine, procedures  , and Obtaining or reviewing history  .   "

## 2025-04-07 ENCOUNTER — HOSPITAL ENCOUNTER (INPATIENT)
Facility: HOSPITAL | Age: OVER 89
LOS: 3 days | Discharge: HOSPICE | DRG: 640 | End: 2025-04-10
Attending: EMERGENCY MEDICINE | Admitting: STUDENT IN AN ORGANIZED HEALTH CARE EDUCATION/TRAINING PROGRAM
Payer: COMMERCIAL

## 2025-04-07 ENCOUNTER — APPOINTMENT (EMERGENCY)
Dept: CT IMAGING | Facility: HOSPITAL | Age: OVER 89
DRG: 640 | End: 2025-04-07
Payer: COMMERCIAL

## 2025-04-07 ENCOUNTER — APPOINTMENT (EMERGENCY)
Dept: RADIOLOGY | Facility: HOSPITAL | Age: OVER 89
DRG: 640 | End: 2025-04-07
Payer: COMMERCIAL

## 2025-04-07 DIAGNOSIS — Z51.5 COMFORT MEASURES ONLY STATUS: ICD-10-CM

## 2025-04-07 DIAGNOSIS — Z51.5 HOSPICE CARE PATIENT: ICD-10-CM

## 2025-04-07 DIAGNOSIS — E87.70 VOLUME OVERLOAD: ICD-10-CM

## 2025-04-07 DIAGNOSIS — I42.9 CARDIOMYOPATHY (HCC): ICD-10-CM

## 2025-04-07 DIAGNOSIS — I48.0 PAROXYSMAL ATRIAL FIBRILLATION (HCC): ICD-10-CM

## 2025-04-07 DIAGNOSIS — E87.1 HYPONATREMIA: Primary | ICD-10-CM

## 2025-04-07 DIAGNOSIS — I50.9 HEART FAILURE (HCC): ICD-10-CM

## 2025-04-07 DIAGNOSIS — L89.156 PRESSURE INJURY OF DEEP TISSUE OF SACRAL REGION: ICD-10-CM

## 2025-04-07 PROBLEM — J96.01 ACUTE RESPIRATORY FAILURE WITH HYPOXIA (HCC): Status: ACTIVE | Noted: 2025-04-07

## 2025-04-07 PROBLEM — R91.1 PULMONARY NODULE: Status: ACTIVE | Noted: 2025-04-07

## 2025-04-07 PROBLEM — L89.159 SACRAL DECUBITUS ULCER: Status: ACTIVE | Noted: 2025-04-07

## 2025-04-07 PROBLEM — M48.061 SPINAL STENOSIS AT L4-L5 LEVEL: Status: ACTIVE | Noted: 2025-04-07

## 2025-04-07 PROBLEM — B33.8 RSV (RESPIRATORY SYNCYTIAL VIRUS INFECTION): Status: ACTIVE | Noted: 2025-04-07

## 2025-04-07 LAB
2HR DELTA HS TROPONIN: -1 NG/L
4HR DELTA HS TROPONIN: 2 NG/L
ALBUMIN SERPL BCG-MCNC: 3.2 G/DL (ref 3.5–5)
ALP SERPL-CCNC: 100 U/L (ref 34–104)
ALT SERPL W P-5'-P-CCNC: 39 U/L (ref 7–52)
ANION GAP SERPL CALCULATED.3IONS-SCNC: 10 MMOL/L (ref 4–13)
ANION GAP SERPL CALCULATED.3IONS-SCNC: 8 MMOL/L (ref 4–13)
APTT PPP: 38 SECONDS (ref 23–34)
AST SERPL W P-5'-P-CCNC: 24 U/L (ref 13–39)
ATRIAL RATE: 326 BPM
BASOPHILS # BLD AUTO: 0.03 THOUSANDS/ÂΜL (ref 0–0.1)
BASOPHILS NFR BLD AUTO: 0 % (ref 0–1)
BILIRUB SERPL-MCNC: 1.2 MG/DL (ref 0.2–1)
BNP SERPL-MCNC: 1369 PG/ML (ref 0–100)
BUN SERPL-MCNC: 32 MG/DL (ref 5–25)
BUN SERPL-MCNC: 33 MG/DL (ref 5–25)
CALCIUM ALBUM COR SERPL-MCNC: 9.6 MG/DL (ref 8.3–10.1)
CALCIUM SERPL-MCNC: 9 MG/DL (ref 8.4–10.2)
CALCIUM SERPL-MCNC: 9.3 MG/DL (ref 8.4–10.2)
CARDIAC TROPONIN I PNL SERPL HS: 18 NG/L (ref ?–50)
CARDIAC TROPONIN I PNL SERPL HS: 19 NG/L (ref ?–50)
CARDIAC TROPONIN I PNL SERPL HS: 21 NG/L (ref ?–50)
CHLORIDE SERPL-SCNC: 81 MMOL/L (ref 96–108)
CHLORIDE SERPL-SCNC: 82 MMOL/L (ref 96–108)
CO2 SERPL-SCNC: 27 MMOL/L (ref 21–32)
CO2 SERPL-SCNC: 29 MMOL/L (ref 21–32)
CREAT SERPL-MCNC: 0.88 MG/DL (ref 0.6–1.3)
CREAT SERPL-MCNC: 0.94 MG/DL (ref 0.6–1.3)
CREAT UR-MCNC: 63.5 MG/DL
EOSINOPHIL # BLD AUTO: 0 THOUSAND/ÂΜL (ref 0–0.61)
EOSINOPHIL NFR BLD AUTO: 0 % (ref 0–6)
ERYTHROCYTE [DISTWIDTH] IN BLOOD BY AUTOMATED COUNT: 14.6 % (ref 11.6–15.1)
FLUAV RNA RESP QL NAA+PROBE: NEGATIVE
FLUBV RNA RESP QL NAA+PROBE: NEGATIVE
GFR SERPL CREATININE-BSD FRML MDRD: 51 ML/MIN/1.73SQ M
GFR SERPL CREATININE-BSD FRML MDRD: 55 ML/MIN/1.73SQ M
GLUCOSE SERPL-MCNC: 165 MG/DL (ref 65–140)
GLUCOSE SERPL-MCNC: 224 MG/DL (ref 65–140)
GLUCOSE SERPL-MCNC: 233 MG/DL (ref 65–140)
GLUCOSE SERPL-MCNC: 282 MG/DL (ref 65–140)
HCT VFR BLD AUTO: 35.7 % (ref 34.8–46.1)
HGB BLD-MCNC: 12.4 G/DL (ref 11.5–15.4)
IMM GRANULOCYTES # BLD AUTO: 0.26 THOUSAND/UL (ref 0–0.2)
IMM GRANULOCYTES NFR BLD AUTO: 2 % (ref 0–2)
INR PPP: 1.6 (ref 0.85–1.19)
LACTATE SERPL-SCNC: 1.8 MMOL/L (ref 0.5–2)
LIPASE SERPL-CCNC: 10 U/L (ref 11–82)
LYMPHOCYTES # BLD AUTO: 0.95 THOUSANDS/ÂΜL (ref 0.6–4.47)
LYMPHOCYTES NFR BLD AUTO: 7 % (ref 14–44)
MAGNESIUM SERPL-MCNC: 1.8 MG/DL (ref 1.9–2.7)
MCH RBC QN AUTO: 32.1 PG (ref 26.8–34.3)
MCHC RBC AUTO-ENTMCNC: 34.7 G/DL (ref 31.4–37.4)
MCV RBC AUTO: 93 FL (ref 82–98)
MONOCYTES # BLD AUTO: 1.32 THOUSAND/ÂΜL (ref 0.17–1.22)
MONOCYTES NFR BLD AUTO: 10 % (ref 4–12)
NEUTROPHILS # BLD AUTO: 10.95 THOUSANDS/ÂΜL (ref 1.85–7.62)
NEUTS SEG NFR BLD AUTO: 81 % (ref 43–75)
NRBC BLD AUTO-RTO: 0 /100 WBCS
OSMOLALITY UR/SERPL-RTO: 271 MMOL/KG (ref 282–298)
PLATELET # BLD AUTO: 319 THOUSANDS/UL (ref 149–390)
PMV BLD AUTO: 10.7 FL (ref 8.9–12.7)
POTASSIUM SERPL-SCNC: 4.6 MMOL/L (ref 3.5–5.3)
POTASSIUM SERPL-SCNC: 4.9 MMOL/L (ref 3.5–5.3)
PROCALCITONIN SERPL-MCNC: 0.3 NG/ML
PROT SERPL-MCNC: 6.2 G/DL (ref 6.4–8.4)
PROTHROMBIN TIME: 19.8 SECONDS (ref 12.3–15)
QRS AXIS: -75 DEGREES
QRSD INTERVAL: 132 MS
QT INTERVAL: 348 MS
QTC INTERVAL: 473 MS
RBC # BLD AUTO: 3.86 MILLION/UL (ref 3.81–5.12)
RSV RNA RESP QL NAA+PROBE: POSITIVE
SARS-COV-2 RNA RESP QL NAA+PROBE: NEGATIVE
SODIUM SERPL-SCNC: 118 MMOL/L (ref 135–147)
SODIUM SERPL-SCNC: 119 MMOL/L (ref 135–147)
SODIUM UR-SCNC: 17 MMOL/L
T WAVE AXIS: 110 DEGREES
TSH SERPL DL<=0.05 MIU/L-ACNC: 9.04 UIU/ML (ref 0.45–4.5)
URATE SERPL-MCNC: 5.3 MG/DL (ref 2–7.5)
VENTRICULAR RATE: 111 BPM
WBC # BLD AUTO: 13.51 THOUSAND/UL (ref 4.31–10.16)

## 2025-04-07 PROCEDURE — 93005 ELECTROCARDIOGRAM TRACING: CPT

## 2025-04-07 PROCEDURE — 74177 CT ABD & PELVIS W/CONTRAST: CPT

## 2025-04-07 PROCEDURE — 99223 1ST HOSP IP/OBS HIGH 75: CPT | Performed by: STUDENT IN AN ORGANIZED HEALTH CARE EDUCATION/TRAINING PROGRAM

## 2025-04-07 PROCEDURE — 85025 COMPLETE CBC W/AUTO DIFF WBC: CPT | Performed by: EMERGENCY MEDICINE

## 2025-04-07 PROCEDURE — 96375 TX/PRO/DX INJ NEW DRUG ADDON: CPT

## 2025-04-07 PROCEDURE — 84550 ASSAY OF BLOOD/URIC ACID: CPT | Performed by: NURSE PRACTITIONER

## 2025-04-07 PROCEDURE — 83880 ASSAY OF NATRIURETIC PEPTIDE: CPT | Performed by: EMERGENCY MEDICINE

## 2025-04-07 PROCEDURE — 99285 EMERGENCY DEPT VISIT HI MDM: CPT | Performed by: EMERGENCY MEDICINE

## 2025-04-07 PROCEDURE — 90677 PCV20 VACCINE IM: CPT | Performed by: STUDENT IN AN ORGANIZED HEALTH CARE EDUCATION/TRAINING PROGRAM

## 2025-04-07 PROCEDURE — 84443 ASSAY THYROID STIM HORMONE: CPT | Performed by: NURSE PRACTITIONER

## 2025-04-07 PROCEDURE — 82948 REAGENT STRIP/BLOOD GLUCOSE: CPT

## 2025-04-07 PROCEDURE — 0241U HB NFCT DS VIR RESP RNA 4 TRGT: CPT | Performed by: EMERGENCY MEDICINE

## 2025-04-07 PROCEDURE — 82570 ASSAY OF URINE CREATININE: CPT | Performed by: NURSE PRACTITIONER

## 2025-04-07 PROCEDURE — 93010 ELECTROCARDIOGRAM REPORT: CPT | Performed by: INTERNAL MEDICINE

## 2025-04-07 PROCEDURE — 96374 THER/PROPH/DIAG INJ IV PUSH: CPT

## 2025-04-07 PROCEDURE — 85610 PROTHROMBIN TIME: CPT | Performed by: EMERGENCY MEDICINE

## 2025-04-07 PROCEDURE — 84300 ASSAY OF URINE SODIUM: CPT | Performed by: NURSE PRACTITIONER

## 2025-04-07 PROCEDURE — 83690 ASSAY OF LIPASE: CPT | Performed by: EMERGENCY MEDICINE

## 2025-04-07 PROCEDURE — 80048 BASIC METABOLIC PNL TOTAL CA: CPT | Performed by: NURSE PRACTITIONER

## 2025-04-07 PROCEDURE — 83935 ASSAY OF URINE OSMOLALITY: CPT | Performed by: NURSE PRACTITIONER

## 2025-04-07 PROCEDURE — 83930 ASSAY OF BLOOD OSMOLALITY: CPT | Performed by: NURSE PRACTITIONER

## 2025-04-07 PROCEDURE — G0009 ADMIN PNEUMOCOCCAL VACCINE: HCPCS | Performed by: STUDENT IN AN ORGANIZED HEALTH CARE EDUCATION/TRAINING PROGRAM

## 2025-04-07 PROCEDURE — 84484 ASSAY OF TROPONIN QUANT: CPT | Performed by: EMERGENCY MEDICINE

## 2025-04-07 PROCEDURE — 83605 ASSAY OF LACTIC ACID: CPT | Performed by: NURSE PRACTITIONER

## 2025-04-07 PROCEDURE — 36415 COLL VENOUS BLD VENIPUNCTURE: CPT | Performed by: EMERGENCY MEDICINE

## 2025-04-07 PROCEDURE — 85730 THROMBOPLASTIN TIME PARTIAL: CPT | Performed by: EMERGENCY MEDICINE

## 2025-04-07 PROCEDURE — 71275 CT ANGIOGRAPHY CHEST: CPT

## 2025-04-07 PROCEDURE — 71045 X-RAY EXAM CHEST 1 VIEW: CPT

## 2025-04-07 PROCEDURE — 83735 ASSAY OF MAGNESIUM: CPT | Performed by: EMERGENCY MEDICINE

## 2025-04-07 PROCEDURE — 99285 EMERGENCY DEPT VISIT HI MDM: CPT

## 2025-04-07 PROCEDURE — 80053 COMPREHEN METABOLIC PANEL: CPT | Performed by: EMERGENCY MEDICINE

## 2025-04-07 PROCEDURE — 84145 PROCALCITONIN (PCT): CPT | Performed by: NURSE PRACTITIONER

## 2025-04-07 RX ORDER — INSULIN LISPRO 100 [IU]/ML
1-5 INJECTION, SOLUTION INTRAVENOUS; SUBCUTANEOUS EVERY 6 HOURS SCHEDULED
Status: DISCONTINUED | OUTPATIENT
Start: 2025-04-07 | End: 2025-04-09

## 2025-04-07 RX ORDER — DIPHENHYDRAMINE HYDROCHLORIDE 50 MG/ML
25 INJECTION, SOLUTION INTRAMUSCULAR; INTRAVENOUS ONCE
Status: COMPLETED | OUTPATIENT
Start: 2025-04-07 | End: 2025-04-07

## 2025-04-07 RX ORDER — ATORVASTATIN CALCIUM 40 MG/1
80 TABLET, FILM COATED ORAL DAILY
Status: DISCONTINUED | OUTPATIENT
Start: 2025-04-08 | End: 2025-04-09

## 2025-04-07 RX ORDER — ASPIRIN 81 MG/1
81 TABLET ORAL DAILY
Status: DISCONTINUED | OUTPATIENT
Start: 2025-04-08 | End: 2025-04-09

## 2025-04-07 RX ORDER — DILTIAZEM HYDROCHLORIDE 5 MG/ML
15 INJECTION INTRAVENOUS ONCE
Status: COMPLETED | OUTPATIENT
Start: 2025-04-07 | End: 2025-04-07

## 2025-04-07 RX ORDER — CHLORHEXIDINE GLUCONATE ORAL RINSE 1.2 MG/ML
15 SOLUTION DENTAL EVERY 12 HOURS SCHEDULED
Status: DISCONTINUED | OUTPATIENT
Start: 2025-04-07 | End: 2025-04-09

## 2025-04-07 RX ORDER — ATENOLOL 25 MG/1
25 TABLET ORAL DAILY
Status: DISCONTINUED | OUTPATIENT
Start: 2025-04-08 | End: 2025-04-09

## 2025-04-07 RX ORDER — FUROSEMIDE 10 MG/ML
40 INJECTION INTRAMUSCULAR; INTRAVENOUS ONCE
Status: COMPLETED | OUTPATIENT
Start: 2025-04-07 | End: 2025-04-07

## 2025-04-07 RX ORDER — LEVOTHYROXINE SODIUM 150 UG/1
150 TABLET ORAL
Status: DISCONTINUED | OUTPATIENT
Start: 2025-04-08 | End: 2025-04-09

## 2025-04-07 RX ORDER — LOSARTAN POTASSIUM 50 MG/1
50 TABLET ORAL DAILY
Status: DISCONTINUED | OUTPATIENT
Start: 2025-04-08 | End: 2025-04-09

## 2025-04-07 RX ORDER — FENOFIBRATE 145 MG/1
145 TABLET, FILM COATED ORAL DAILY
Status: DISCONTINUED | OUTPATIENT
Start: 2025-04-08 | End: 2025-04-09

## 2025-04-07 RX ADMIN — APIXABAN 2.5 MG: 2.5 TABLET, FILM COATED ORAL at 20:17

## 2025-04-07 RX ADMIN — PNEUMOCOCCAL 20-VALENT CONJUGATE VACCINE 0.5 ML
2.2; 2.2; 2.2; 2.2; 2.2; 2.2; 2.2; 2.2; 2.2; 2.2; 2.2; 2.2; 2.2; 2.2; 2.2; 2.2; 4.4; 2.2; 2.2; 2.2 INJECTION, SUSPENSION INTRAMUSCULAR at 18:33

## 2025-04-07 RX ADMIN — CHLORHEXIDINE GLUCONATE 0.12% ORAL RINSE 15 ML: 1.2 LIQUID ORAL at 20:18

## 2025-04-07 RX ADMIN — IOHEXOL 100 ML: 350 INJECTION, SOLUTION INTRAVENOUS at 14:13

## 2025-04-07 RX ADMIN — FUROSEMIDE 40 MG: 10 INJECTION, SOLUTION INTRAMUSCULAR; INTRAVENOUS at 15:34

## 2025-04-07 RX ADMIN — INSULIN LISPRO 1 UNITS: 100 INJECTION, SOLUTION INTRAVENOUS; SUBCUTANEOUS at 17:48

## 2025-04-07 RX ADMIN — DIPHENHYDRAMINE HYDROCHLORIDE 25 MG: 50 INJECTION, SOLUTION INTRAMUSCULAR; INTRAVENOUS at 13:06

## 2025-04-07 RX ADMIN — DILTIAZEM HYDROCHLORIDE 15 MG: 5 INJECTION INTRAVENOUS at 13:06

## 2025-04-07 NOTE — ASSESSMENT & PLAN NOTE
Pt presents with volume overload  Continue diuresis with close monitoring of Na, as patient was hyponatremic with Na 118 in ED  Will check Echo  Strict I/O

## 2025-04-07 NOTE — ASSESSMENT & PLAN NOTE
Lab Results   Component Value Date    EGFR 51 04/07/2025    EGFR 73 03/11/2025    EGFR 56 (L) 03/08/2025    CREATININE 0.94 04/07/2025    CREATININE 0.75 03/11/2025    CREATININE 0.94 03/08/2025   Creatine 0.94, GFR 51  Appears to be at baseline  Strict I/O  Trend renal indices  Avoid nephrotoxins

## 2025-04-07 NOTE — ASSESSMENT & PLAN NOTE
Rate high currently-likely 2/2 volume overload  Received 1 dose cardizem in ED  Will restart home atenolol  Consider PRN IV metoprolol  Continue Eliquis

## 2025-04-07 NOTE — ASSESSMENT & PLAN NOTE
Pt presented to ED with SOB, had been started on oxygen at the nursing home when she tested positive for RSV  Vascular congestion and pleural effusions noted on imaging, no PE  On 2L NC  Given lasix in ED    Plan:  Will check procal, hold on abx for now  Monitor I/O  Will check echo, echo from 8/2024 showed EF 61-65% with pericardial effusion  Continue diuresis  Wean oxygen as able for sat>90%

## 2025-04-07 NOTE — ASSESSMENT & PLAN NOTE
Pt recently tested positive for RSV at her rehab facility  Tested positive again today in ED  Supportive care
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
## 2025-04-07 NOTE — ASSESSMENT & PLAN NOTE
Appears to be 2/2 hypervolemia-pt with elevated BNP, vascular congestion on CXR, 4+ pitting LE edema  Will give one dose of lasix 40mg IV now  Place ramírez catheter for strict I/O  Check urine lytes, serum and urine osmo  Q 4h Na checks  Hold on IVF  Suspect Na will improve once patient diureses  Goal would be 126-128 by 4/8 1200

## 2025-04-07 NOTE — ASSESSMENT & PLAN NOTE
Lab Results   Component Value Date    HGBA1C 8.1 (H) 01/16/2025     Check A1c  Hold home metformin and Trulicity  Accuchecks and SSI
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
## 2025-04-07 NOTE — ASSESSMENT & PLAN NOTE
Right upper lobe nodule seen on CT scan  Can consider optional CT follow up at 12 months if patient wishes to pursue

## 2025-04-07 NOTE — ASSESSMENT & PLAN NOTE
Severe spinal stenosis at L4-L5 seen on imaging with concern for mass effect on cauda equina nerve roots  Pt without complaints of back pain, complications concerning for cauda equina syndrome  If pain or concern for cauda equina could consider MRI evaluation

## 2025-04-07 NOTE — PLAN OF CARE
Problem: PAIN - ADULT  Goal: Verbalizes/displays adequate comfort level or baseline comfort level  Description: Interventions:- Encourage patient to monitor pain and request assistance- Assess pain using appropriate pain scale- Administer analgesics based on type and severity of pain and evaluate response- Implement non-pharmacological measures as appropriate and evaluate response- Consider cultural and social influences on pain and pain management- Notify physician/advanced practitioner if interventions unsuccessful or patient reports new pain  Reactivated

## 2025-04-08 ENCOUNTER — APPOINTMENT (INPATIENT)
Dept: NON INVASIVE DIAGNOSTICS | Facility: HOSPITAL | Age: OVER 89
DRG: 640 | End: 2025-04-08
Payer: COMMERCIAL

## 2025-04-08 LAB
ALBUMIN SERPL BCG-MCNC: 3 G/DL (ref 3.5–5)
ALP SERPL-CCNC: 134 U/L (ref 34–104)
ALT SERPL W P-5'-P-CCNC: 54 U/L (ref 7–52)
ANION GAP SERPL CALCULATED.3IONS-SCNC: 7 MMOL/L (ref 4–13)
ANION GAP SERPL CALCULATED.3IONS-SCNC: 7 MMOL/L (ref 4–13)
ANION GAP SERPL CALCULATED.3IONS-SCNC: 8 MMOL/L (ref 4–13)
ANION GAP SERPL CALCULATED.3IONS-SCNC: 8 MMOL/L (ref 4–13)
ANION GAP SERPL CALCULATED.3IONS-SCNC: 9 MMOL/L (ref 4–13)
ANION GAP SERPL CALCULATED.3IONS-SCNC: 9 MMOL/L (ref 4–13)
AORTIC ROOT: 2.9 CM
ASCENDING AORTA: 3.1 CM
AST SERPL W P-5'-P-CCNC: 37 U/L (ref 13–39)
BASOPHILS # BLD AUTO: 0.02 THOUSANDS/ÂΜL (ref 0–0.1)
BASOPHILS NFR BLD AUTO: 0 % (ref 0–1)
BILIRUB SERPL-MCNC: 1.08 MG/DL (ref 0.2–1)
BSA FOR ECHO PROCEDURE: 1.81 M2
BUN SERPL-MCNC: 32 MG/DL (ref 5–25)
BUN SERPL-MCNC: 33 MG/DL (ref 5–25)
BUN SERPL-MCNC: 36 MG/DL (ref 5–25)
CA-I BLD-SCNC: 1.12 MMOL/L (ref 1.12–1.32)
CALCIUM ALBUM COR SERPL-MCNC: 9.6 MG/DL (ref 8.3–10.1)
CALCIUM SERPL-MCNC: 8.6 MG/DL (ref 8.4–10.2)
CALCIUM SERPL-MCNC: 8.6 MG/DL (ref 8.4–10.2)
CALCIUM SERPL-MCNC: 8.7 MG/DL (ref 8.4–10.2)
CALCIUM SERPL-MCNC: 8.7 MG/DL (ref 8.4–10.2)
CALCIUM SERPL-MCNC: 8.8 MG/DL (ref 8.4–10.2)
CALCIUM SERPL-MCNC: 8.8 MG/DL (ref 8.4–10.2)
CHLORIDE SERPL-SCNC: 84 MMOL/L (ref 96–108)
CHLORIDE SERPL-SCNC: 85 MMOL/L (ref 96–108)
CHLORIDE SERPL-SCNC: 89 MMOL/L (ref 96–108)
CO2 SERPL-SCNC: 27 MMOL/L (ref 21–32)
CO2 SERPL-SCNC: 28 MMOL/L (ref 21–32)
CO2 SERPL-SCNC: 29 MMOL/L (ref 21–32)
CREAT SERPL-MCNC: 0.84 MG/DL (ref 0.6–1.3)
CREAT SERPL-MCNC: 0.91 MG/DL (ref 0.6–1.3)
CREAT SERPL-MCNC: 1.06 MG/DL (ref 0.6–1.3)
CREAT SERPL-MCNC: 1.11 MG/DL (ref 0.6–1.3)
DOP CALC LVOT AREA: 2.83 CM2
DOP CALC LVOT DIAMETER: 1.9 CM
E WAVE DECELERATION TIME: 134 MS
E/A RATIO: 3.35
EOSINOPHIL # BLD AUTO: 0.03 THOUSAND/ÂΜL (ref 0–0.61)
EOSINOPHIL NFR BLD AUTO: 0 % (ref 0–6)
ERYTHROCYTE [DISTWIDTH] IN BLOOD BY AUTOMATED COUNT: 14.7 % (ref 11.6–15.1)
EST. AVERAGE GLUCOSE BLD GHB EST-MCNC: 194 MG/DL
FRACTIONAL SHORTENING: 23 (ref 28–44)
GFR SERPL CREATININE-BSD FRML MDRD: 42 ML/MIN/1.73SQ M
GFR SERPL CREATININE-BSD FRML MDRD: 44 ML/MIN/1.73SQ M
GFR SERPL CREATININE-BSD FRML MDRD: 53 ML/MIN/1.73SQ M
GFR SERPL CREATININE-BSD FRML MDRD: 58 ML/MIN/1.73SQ M
GLUCOSE SERPL-MCNC: 106 MG/DL (ref 65–140)
GLUCOSE SERPL-MCNC: 146 MG/DL (ref 65–140)
GLUCOSE SERPL-MCNC: 153 MG/DL (ref 65–140)
GLUCOSE SERPL-MCNC: 156 MG/DL (ref 65–140)
GLUCOSE SERPL-MCNC: 158 MG/DL (ref 65–140)
GLUCOSE SERPL-MCNC: 158 MG/DL (ref 65–140)
GLUCOSE SERPL-MCNC: 160 MG/DL (ref 65–140)
GLUCOSE SERPL-MCNC: 161 MG/DL (ref 65–140)
GLUCOSE SERPL-MCNC: 170 MG/DL (ref 65–140)
GLUCOSE SERPL-MCNC: 90 MG/DL (ref 65–140)
HBA1C MFR BLD: 8.4 %
HCT VFR BLD AUTO: 33.7 % (ref 34.8–46.1)
HGB BLD-MCNC: 11.9 G/DL (ref 11.5–15.4)
IMM GRANULOCYTES # BLD AUTO: 0.27 THOUSAND/UL (ref 0–0.2)
IMM GRANULOCYTES NFR BLD AUTO: 2 % (ref 0–2)
INTERVENTRICULAR SEPTUM IN DIASTOLE (PARASTERNAL SHORT AXIS VIEW): 1 CM
INTERVENTRICULAR SEPTUM: 1 CM (ref 0.6–1.1)
LAAS-AP2: 21.6 CM2
LAAS-AP4: 20.8 CM2
LEFT ATRIUM SIZE: 4 CM
LEFT ATRIUM VOLUME (MOD BIPLANE): 58 ML
LEFT ATRIUM VOLUME INDEX (MOD BIPLANE): 31.9 ML/M2
LEFT INTERNAL DIMENSION IN SYSTOLE: 3.3 CM (ref 2.1–4)
LEFT VENTRICULAR INTERNAL DIMENSION IN DIASTOLE: 4.3 CM (ref 3.5–6)
LEFT VENTRICULAR POSTERIOR WALL IN END DIASTOLE: 0.9 CM
LEFT VENTRICULAR STROKE VOLUME: 38 ML
LV EF US.2D.A4C+ESTIMATED: 27 %
LVSV (TEICH): 38 ML
LYMPHOCYTES # BLD AUTO: 1.44 THOUSANDS/ÂΜL (ref 0.6–4.47)
LYMPHOCYTES NFR BLD AUTO: 12 % (ref 14–44)
MAGNESIUM SERPL-MCNC: 1.8 MG/DL (ref 1.9–2.7)
MCH RBC QN AUTO: 32.8 PG (ref 26.8–34.3)
MCHC RBC AUTO-ENTMCNC: 35.3 G/DL (ref 31.4–37.4)
MCV RBC AUTO: 93 FL (ref 82–98)
MONOCYTES # BLD AUTO: 1.54 THOUSAND/ÂΜL (ref 0.17–1.22)
MONOCYTES NFR BLD AUTO: 13 % (ref 4–12)
MV E'TISSUE VEL-LAT: 8 CM/S
MV E'TISSUE VEL-SEP: 5 CM/S
MV PEAK A VEL: 0.26 M/S
MV PEAK E VEL: 87 CM/S
MV STENOSIS PRESSURE HALF TIME: 39 MS
MV VALVE AREA P 1/2 METHOD: 5.64
NEUTROPHILS # BLD AUTO: 8.33 THOUSANDS/ÂΜL (ref 1.85–7.62)
NEUTS SEG NFR BLD AUTO: 73 % (ref 43–75)
NRBC BLD AUTO-RTO: 0 /100 WBCS
OSMOLALITY UR: 384 MMOL/KG (ref 250–900)
PHOSPHATE SERPL-MCNC: 3.9 MG/DL (ref 2.3–4.1)
PLATELET # BLD AUTO: 326 THOUSANDS/UL (ref 149–390)
PMV BLD AUTO: 10.1 FL (ref 8.9–12.7)
POTASSIUM SERPL-SCNC: 4.4 MMOL/L (ref 3.5–5.3)
POTASSIUM SERPL-SCNC: 4.6 MMOL/L (ref 3.5–5.3)
POTASSIUM SERPL-SCNC: 4.7 MMOL/L (ref 3.5–5.3)
POTASSIUM SERPL-SCNC: 4.8 MMOL/L (ref 3.5–5.3)
PROCALCITONIN SERPL-MCNC: 0.32 NG/ML
PROT SERPL-MCNC: 5.8 G/DL (ref 6.4–8.4)
RA PRESSURE ESTIMATED: 3 MMHG
RBC # BLD AUTO: 3.63 MILLION/UL (ref 3.81–5.12)
RIGHT ATRIUM AREA SYSTOLE A4C: 21.6 CM2
RIGHT VENTRICLE ID DIMENSION: 3.7 CM
RV PSP: 31 MMHG
SL CV LEFT ATRIUM LENGTH A2C: 6.2 CM
SL CV LV EF: 27
SL CV PED ECHO LEFT VENTRICLE DIASTOLIC VOLUME (MOD BIPLANE) 2D: 84 ML
SL CV PED ECHO LEFT VENTRICLE SYSTOLIC VOLUME (MOD BIPLANE) 2D: 46 ML
SODIUM SERPL-SCNC: 120 MMOL/L (ref 135–147)
SODIUM SERPL-SCNC: 121 MMOL/L (ref 135–147)
SODIUM SERPL-SCNC: 121 MMOL/L (ref 135–147)
SODIUM SERPL-SCNC: 124 MMOL/L (ref 135–147)
TR MAX PG: 28 MMHG
TR PEAK VELOCITY: 2.7 M/S
TRICUSPID ANNULAR PLANE SYSTOLIC EXCURSION: 1.2 CM
TRICUSPID VALVE PEAK REGURGITATION VELOCITY: 2.65 M/S
WBC # BLD AUTO: 11.63 THOUSAND/UL (ref 4.31–10.16)

## 2025-04-08 PROCEDURE — 80048 BASIC METABOLIC PNL TOTAL CA: CPT | Performed by: NURSE PRACTITIONER

## 2025-04-08 PROCEDURE — 80053 COMPREHEN METABOLIC PANEL: CPT | Performed by: NURSE PRACTITIONER

## 2025-04-08 PROCEDURE — NC001 PR NO CHARGE: Performed by: STUDENT IN AN ORGANIZED HEALTH CARE EDUCATION/TRAINING PROGRAM

## 2025-04-08 PROCEDURE — 93306 TTE W/DOPPLER COMPLETE: CPT

## 2025-04-08 PROCEDURE — 99233 SBSQ HOSP IP/OBS HIGH 50: CPT | Performed by: STUDENT IN AN ORGANIZED HEALTH CARE EDUCATION/TRAINING PROGRAM

## 2025-04-08 PROCEDURE — 82948 REAGENT STRIP/BLOOD GLUCOSE: CPT

## 2025-04-08 PROCEDURE — 82330 ASSAY OF CALCIUM: CPT | Performed by: NURSE PRACTITIONER

## 2025-04-08 PROCEDURE — 84100 ASSAY OF PHOSPHORUS: CPT | Performed by: NURSE PRACTITIONER

## 2025-04-08 PROCEDURE — 85025 COMPLETE CBC W/AUTO DIFF WBC: CPT | Performed by: NURSE PRACTITIONER

## 2025-04-08 PROCEDURE — 84145 PROCALCITONIN (PCT): CPT | Performed by: NURSE PRACTITIONER

## 2025-04-08 PROCEDURE — 93306 TTE W/DOPPLER COMPLETE: CPT | Performed by: INTERNAL MEDICINE

## 2025-04-08 PROCEDURE — 83036 HEMOGLOBIN GLYCOSYLATED A1C: CPT | Performed by: NURSE PRACTITIONER

## 2025-04-08 PROCEDURE — 83735 ASSAY OF MAGNESIUM: CPT | Performed by: NURSE PRACTITIONER

## 2025-04-08 RX ORDER — BUMETANIDE 0.25 MG/ML
1 INJECTION, SOLUTION INTRAMUSCULAR; INTRAVENOUS ONCE
Status: COMPLETED | OUTPATIENT
Start: 2025-04-08 | End: 2025-04-08

## 2025-04-08 RX ORDER — BUMETANIDE 0.25 MG/ML
1 INJECTION, SOLUTION INTRAMUSCULAR; INTRAVENOUS ONCE
Status: DISCONTINUED | OUTPATIENT
Start: 2025-04-08 | End: 2025-04-08

## 2025-04-08 RX ORDER — FUROSEMIDE 10 MG/ML
40 INJECTION INTRAMUSCULAR; INTRAVENOUS ONCE
Status: COMPLETED | OUTPATIENT
Start: 2025-04-08 | End: 2025-04-08

## 2025-04-08 RX ADMIN — LOSARTAN POTASSIUM 50 MG: 50 TABLET, FILM COATED ORAL at 09:15

## 2025-04-08 RX ADMIN — APIXABAN 2.5 MG: 2.5 TABLET, FILM COATED ORAL at 09:15

## 2025-04-08 RX ADMIN — INSULIN LISPRO 1 UNITS: 100 INJECTION, SOLUTION INTRAVENOUS; SUBCUTANEOUS at 12:50

## 2025-04-08 RX ADMIN — FENOFIBRATE 145 MG: 145 TABLET, FILM COATED ORAL at 09:15

## 2025-04-08 RX ADMIN — FUROSEMIDE 40 MG: 10 INJECTION, SOLUTION INTRAMUSCULAR; INTRAVENOUS at 05:55

## 2025-04-08 RX ADMIN — NOREPINEPHRINE BITARTRATE 2 MCG/MIN: 1 INJECTION, SOLUTION, CONCENTRATE INTRAVENOUS at 16:10

## 2025-04-08 RX ADMIN — CHLORHEXIDINE GLUCONATE 0.12% ORAL RINSE 15 ML: 1.2 LIQUID ORAL at 09:15

## 2025-04-08 RX ADMIN — LEVOTHYROXINE SODIUM 150 MCG: 0.15 TABLET ORAL at 06:07

## 2025-04-08 RX ADMIN — ATORVASTATIN CALCIUM 80 MG: 40 TABLET, FILM COATED ORAL at 09:15

## 2025-04-08 RX ADMIN — BUMETANIDE 1 MG: 0.25 INJECTION INTRAMUSCULAR; INTRAVENOUS at 22:34

## 2025-04-08 RX ADMIN — ATENOLOL 25 MG: 25 TABLET ORAL at 09:15

## 2025-04-08 RX ADMIN — INSULIN LISPRO 1 UNITS: 100 INJECTION, SOLUTION INTRAVENOUS; SUBCUTANEOUS at 05:55

## 2025-04-08 RX ADMIN — INSULIN LISPRO 1 UNITS: 100 INJECTION, SOLUTION INTRAVENOUS; SUBCUTANEOUS at 00:23

## 2025-04-08 RX ADMIN — BUMETANIDE 1 MG: 0.25 INJECTION INTRAMUSCULAR; INTRAVENOUS at 17:18

## 2025-04-08 RX ADMIN — ASPIRIN 81 MG: 81 TABLET, COATED ORAL at 09:15

## 2025-04-08 NOTE — ASSESSMENT & PLAN NOTE
Elevated rates on presentation-likely 2/2 volume overload, now improved   Received 1 dose cardizem in ED  Continue home atenolol  Consider PRN IV metoprolol  Continue Eliquis

## 2025-04-08 NOTE — WOUND OSTOMY CARE
Consult Note - Wound   Genia Call 89 y.o. female MRN: [id number]  Unit/Bed#: ICU 12 Encounter: [id number]        History and Present Illness:  Patient is a 89 yo female that was admitted to [facility]  for treatment of hyponatremia. Currently positive for RSV and on droplet precautions. Patient has a PMH of A-fib on eliquis, CKD 3, hypothyroid, HTN, DM 2. Patient is alert and oriented times two to three, [place] and agreeable to assessment. Patient is assist of one to two for turning and repositioning, dependent for care, set up for meals. Patient is incontinent of bowel and has indwelling catheter in place for urine management. On assessment, patient is in ICU, on critical care low air loss mattress with pillows and wedges in place for offloading. Primary PCA at bedside providing bath and ADLs. Patient heavy assist of two to stand and pivot to chair.     Wound Care was consulted for sacral wound.     Assessment Findings:   B/L heels are dry intact and slowly ignacio with no skin loss or wounds present. Recommend preventative silicone foam dressings and proper offloading/ repositioning.  Prevalon boots applied.     POA Bilateral Buttocks Evolving DTPI - small intact area of purple non blanchable skin with linear sacral slit and two large irregular shaped areas of skin loss photographed and measured together. Open aspects at least partial thickness. Wound bed are 20% pink/beefy red tissue with 80% yellow /brown adherent slough tissue. True depth unknown due to devitalized tissue present. Small amount of bloody drainage present. Lili wound is hyperpigmented, dry, scaly, and blanchable. Likely combined etiology of pressure, friction, and shear with component of moisture due to incontinence. Recommend silver alginate to open aspects and silicone foam dressing. Referral to ambulatory wound care center.  Deep Tissue Pressure Injury wounds have the potential to evolve into unstageable, stage III or stage IV wounds.     No  induration, fluctuance, odor, warmth/temperature differences, redness, or purulence noted to the above noted wounds and skin areas assessed. New dressings applied per orders listed below. Patient tolerated well- no s/s of non-verbal pain or discomfort observed during the encounter. Bedside nurse aware of plan of care. See flow sheets for more detailed assessment findings.      Orders listed below and wound care will continue to follow, call or Secure Chat with questions.     Skin care plans:  1-Wash Sacro Buttocks area with soap and water. Pat Dry. Cover wound bed with silver alginate. Apply Silicone foam dressing to sacrum, lakeisha w/T. Change  Daily or PRN for drainage/dislodgement.   2-Elevate heels to offload pressure. Apply prevalon boots  3-Ehob cushion in chair when out of bed.  4-Moisturize skin daily with skin nourishing cream.  5-Turn/reposition q2h for pressure re-distribution on skin.  6-Cleanse B/L Heels with soap and water. Pat dry. Apply Silicone Border Foam (Mepilex) to areas. Lakeisha with P for Prevention and change every 3 days or PRN soilage/displacement. Peel back and inspect Q-shift.  7-Schedule Follow-up Outpatient Wound Appointment. Ambulatory Referral Placed.   Call Outpatient Wound and Ostomy Care Team @ [phone]   Option 1: [place], [place], [place], [place]  Option 2: [place], [place], [place]\  8-P-500 low air loss mattress upon transfer out of ICU    Wounds:  Wound 04/07/25 Pressure Injury Buttocks (Active)   Wound Image   04/08/25 0842   Wound Description Beefy red;Dark edges;Fragile;Light purple;Non-blanchable erythema;Pink;Brown;Clean 04/08/25 0842   Pressure Injury Stage DTPI 04/08/25 0842   Wound Length (cm) 6 cm 04/08/25 0842   Wound Width (cm) 9 cm 04/08/25 0842   Wound Depth (cm) 0.2 cm 04/08/25 0842   Wound Surface Area (cm^2) 54 cm^2 04/08/25 0842   Wound Volume (cm^3) 10.8 cm^3 04/08/25 0842   Calculated Wound Volume (cm^3) 10.8 cm^3 04/08/25 0842   Drainage Amount Small  04/08/25 0842   Drainage Description Bloody 04/08/25 0842   Lili-wound Assessment Hyperpigmented;Fragile;Pink;Purple 04/08/25 0842   Treatments Cleansed;Irrigation with NSS;Site care 04/08/25 0842   Dressing Calcium Alginate with Silver;Foam, Silicon (eg. Allevyn, etc) 04/08/25 0842   Wound packed? No 04/08/25 0842   Dressing Changed New 04/08/25 0842   Patient Tolerance Tolerated well 04/08/25 0842   Dressing Status Clean;Dry;Intact 04/08/25 0842               Audrey Thurman RN, BSN, CCRN

## 2025-04-08 NOTE — ASSESSMENT & PLAN NOTE
Lab Results   Component Value Date    EGFR 58 04/08/2025    EGFR 55 04/07/2025    EGFR 51 04/07/2025    CREATININE 0.84 04/08/2025    CREATININE 0.88 04/07/2025    CREATININE 0.94 04/07/2025   Creatine 0.94, GFR 51  Appears to be at baseline  Strict I/O  Trend renal indices  Avoid nephrotoxins

## 2025-04-08 NOTE — PLAN OF CARE
Problem: PAIN - ADULT  Goal: Verbalizes/displays adequate comfort level or baseline comfort level  Description: Interventions:- Encourage patient to monitor pain and request assistance- Assess pain using appropriate pain scale- Administer analgesics based on type and severity of pain and evaluate response- Implement non-pharmacological measures as appropriate and evaluate response- Consider cultural and social influences on pain and pain management- Notify physician/advanced practitioner if interventions unsuccessful or patient reports new pain  Outcome: Progressing     Problem: Nutrition/Hydration-ADULT  Goal: Nutrient/Hydration intake appropriate for improving, restoring or maintaining nutritional needs  Description: Monitor and assess patient's nutrition/hydration status for malnutrition. Collaborate with interdisciplinary team and initiate plan and interventions as ordered.  Monitor patient's weight and dietary intake as ordered or per policy. Utilize nutrition screening tool and intervene as necessary. Determine patient's food preferences and provide high-protein, high-caloric foods as appropriate. INTERVENTIONS:- Monitor oral intake, urinary output, labs, and treatment plans- Assess nutrition and hydration status and recommend course of action- Evaluate amount of meals eaten- Assist patient with eating if necessary - Allow adequate time for meals- Recommend/ encourage appropriate diets, oral nutritional supplements, and vitamin/mineral supplements- Order, calculate, and assess calorie counts as needed- Recommend, monitor, and adjust tube feedings and TPN/PPN based on assessed needs- Assess need for intravenous fluids- Provide specific nutrition/hydration education as appropriate- Include patient/family/caregiver in decisions related to nutrition  Outcome: Progressing     Problem: INFECTION - ADULT  Goal: Absence or prevention of progression during hospitalization  Description: INTERVENTIONS:- Assess and  monitor for signs and symptoms of infection- Monitor lab/diagnostic results- Monitor all insertion sites, i.e. indwelling lines, tubes, and drains- Monitor endotracheal if appropriate and nasal secretions for changes in amount and color- [place] appropriate cooling/warming therapies per order- Administer medications as ordered- Instruct and encourage patient and family to use good hand hygiene technique- Identify and instruct in appropriate isolation precautions for identified infection/condition  Outcome: Progressing     Problem: GENITOURINARY - ADULT  Goal: Maintains or returns to baseline urinary function  Description: INTERVENTIONS:- Assess urinary function- Encourage oral fluids to ensure adequate hydration if ordered- Administer IV fluids as ordered to ensure adequate hydration- Administer ordered medications as needed- Offer frequent toileting- Follow urinary retention protocol if ordered  Reactivated  Goal: Absence of urinary retention  Description: INTERVENTIONS:- Assess patient’s ability to void and empty bladder- Monitor I/O- Bladder scan as needed- Discuss with physician/AP medications to alleviate retention as needed- Discuss catheterization for long term situations as appropriate  Reactivated  Goal: Urinary catheter remains patent  Description: INTERVENTIONS:- Assess patency of urinary catheter- If patient has a chronic ramírez, consider changing catheter if non-functioning- Follow guidelines for intermittent irrigation of non-functioning urinary catheter  Reactivated     Problem: NEUROSENSORY - ADULT  Goal: Achieves stable or improved neurological status  Description: INTERVENTIONS- Monitor and report changes in neurological status- Monitor vital signs such as temperature, blood pressure, glucose, and any other labs ordered - Initiate measures to prevent increased intracranial pressure- Monitor for seizure activity and implement precautions if appropriate    Reactivated  Goal: Remains free of injury  related to seizures activity  Description: INTERVENTIONS- Maintain airway, patient safety  and administer oxygen as ordered- Monitor patient for seizure activity, document and report duration and description of seizure to physician/advanced practitioner- If seizure occurs,  ensure patient safety during seizure- Reorient patient post seizure- Seizure pads on all 4 side rails- Instruct patient/family to notify RN of any seizure activity including if an aura is experienced- Instruct patient/family to call for assistance with activity based on nursing assessment- Administer anti-seizure medications if ordered  Reactivated  Goal: Achieves maximal functionality and self care  Description: INTERVENTIONS- Monitor swallowing and airway patency with patient fatigue and changes in neurological status- Encourage and assist patient to increase activity and self care. - Encourage visually impaired, hearing impaired and aphasic patients to use assistive/communication devices  Reactivated     Problem: METABOLIC, FLUID AND ELECTROLYTES - ADULT  Goal: Electrolytes maintained within normal limits  Description: INTERVENTIONS:- Monitor labs and assess patient for signs and symptoms of electrolyte imbalances- Administer electrolyte replacement as ordered- Monitor response to electrolyte replacements, including repeat lab results as appropriate- Instruct patient on fluid and nutrition as appropriate  Reactivated  Goal: Fluid balance maintained  Description: INTERVENTIONS:- Monitor labs - Monitor I/O and WT- Instruct patient on fluid and nutrition as appropriate- Assess for signs & symptoms of volume excess or deficit  Reactivated  Goal: Glucose maintained within target range  Description: INTERVENTIONS:- Monitor Blood Glucose as ordered- Assess for signs and symptoms of hyperglycemia and hypoglycemia- Administer ordered medications to maintain glucose within target range- Assess nutritional intake and initiate nutrition service referral  as needed  Reactivated

## 2025-04-08 NOTE — DISCHARGE INSTR - OTHER ORDERS
Skin care plans:  1-Wash Sacro Buttocks area with soap and water. Pat Dry. Cover wound bed with silver alginate. Apply Silicone foam dressing to sacrum, lakeisha w/T. Change  Daily or PRN for drainage/dislodgement.   2-Elevate heels to offload pressure. Apply prevalon boots  3-Ehob cushion in chair when out of bed.  4-Moisturize skin daily with skin nourishing cream.  5-Turn/reposition q2h for pressure re-distribution on skin.  6-Cleanse B/L Heels with soap and water. Pat dry. Apply Silicone Border Foam (Mepilex) to areas. [place] with P for Prevention and change every 3 days or PRN soilage/displacement. Peel back and inspect Q-shift.  7-P-500 Low air loss mattress    Schedule Follow-up Outpatient Wound Appointment. Ambulatory Referral Placed.   Call Outpatient Wound and Ostomy Care Team @ [phone]   Option 1: [place], [place], [place], [place]  Option 2: [place], [place]

## 2025-04-08 NOTE — CASE MANAGEMENT
Case Management Assessment & Discharge Planning Note    Patient name Genia Call  Location ICU ICU 12 MRN [id number]  : 3/26/1929 Date 2025       Current Admission Date: 2025  Current Admission Diagnosis:Hyponatremia   Patient Active Problem List    Diagnosis Date Noted Date Diagnosed    Hyponatremia 2025     Paroxysmal atrial fibrillation (HCC) 2025     RSV (respiratory syncytial virus infection) 2025     Acute respiratory failure with hypoxia (HCC) 2025     Sacral decubitus ulcer 2025     Volume overload 2025     Pulmonary nodule 2025     Spinal stenosis at L4-L5 level 2025     Type 2 diabetes mellitus with stage 2 chronic kidney disease, without long-term current use of insulin  (HCC) 2022     Mixed hyperlipidemia 2022     Hypertension 2022     Primary hypothyroidism 2022     Osteoporosis with current pathological fracture 2022     Stage 3a chronic kidney disease (HCC) 2022       LOS (days): 1  Geometric Mean LOS (GMLOS) (days):   Days to GMLOS:     OBJECTIVE:    Risk of Unplanned Readmission Score: 15.04         Current admission status: Inpatient       Preferred Pharmacy:   [id number] - [place] - [address]  [address]  [place] [address]  Phone: [phone] Fax: [phone]    Primary Care Provider: Isidro Paz MD    Primary Insurance: [employer]  Secondary Insurance:     ASSESSMENT:  Active Health Care Proxies       Maranda Low Health Care Agent - Sister   Primary Phone: [phone] (Mobile)                 Advance Directives  Does patient have a Health Care POA?: No  Was patient offered paperwork?: Yes (declined; pt presently working w an )  Does patient have Advance Directives?: No  Was patient offered paperwork?: Yes (declined;  pt presently working w an )  Primary Contact: Maranda Low (Sister)  [phone] (Mobile)         Readmission Root Cause  30 Day Readmission:  No    Patient Information  Admitted from:: Facility  Mental Status: Alert  During Assessment patient was accompanied by: Not accompanied during assessment  Assessment information provided by:: Sister  Primary Caregiver: Other (Comment) (facility)  Caregiver's Name:: Shania [facility]  [address] [place], [place] [id number] [phone]  Caregiver's Relationship to Patient:: Facility Staff  Caregiver's Telephone Number:: [place]ess[place], [place] [address] [phone]  Support Systems: Family members  County of Residence: [place]  What [place] do you live in[place]  Home entry access options. Select all that apply.: No steps to enter home  Type of Current Residence: Bi-level  Upon entering residence, is there a bedroom on the main floor (no further steps)?:  (6 stairs to living area w bed and bath;  (+) HRs)  Upon entering residence, is there a bathroom on the main floor (no further steps)?:  (6 stairs to living area w bed and bath; (+) HRs)  Living Arrangements: Lives w/ Family members  Is patient a ?: No    Activities of Daily Living Prior to Admission  Functional Status:  (supervision)  Completes ADLs independently?: No (supervision for showering)  Level of ADL dependence:  (supervision for showering)  Ambulates independently?: Yes  Does patient use assisted devices?: Yes  Assisted Devices (DME) used: Walker, Bedside Commode, Shower Chair (rails in shower, hand held shower head, glucometer, sit to stand recliner)  Does patient currently own DME?: Yes  What DME does the patient currently own?: Walker, Bedside Commode, Shower Chair (rails in shower, hand held shower head, glucometer, sit to stand recliner)  Does patient have a history of Outpatient Therapy (PT/OT)?: Yes  Does the patient have a history of Short-Term Rehab?: Yes  Does patient have a history of HHC?: Yes  Does patient currently have HHC?: No         Patient Information Continued  Income Source:  (SSI and  pension)  Does patient have prescription coverage?: Yes  Can the patient afford their medications and any related supplies (such as glucometers or test strips)?: Yes  Does patient receive dialysis treatments?: No  Does patient have a history of substance abuse?: No  Does patient have a history of Mental Health Diagnosis?: No         Means of Transportation  Means of Transport to Appts:: Family transport      Social Determinants of Health (SDOH)      Flowsheet Row Most Recent Value   Housing Stability    In the last 12 months, was there a time when you were not able to pay the mortgage or rent on time? --  [pt does not pay mortgage or rent]   In the past 12 months, how many times have you moved where you were living? 0   At any time in the past 12 months, were you homeless or living in a shelter (including now)? N   Transportation Needs    In the past 12 months, has lack of transportation kept you from medical appointments or from getting medications? no   In the past 12 months, has lack of transportation kept you from meetings, work, or from getting things needed for daily living? No   Food Insecurity    Within the past 12 months, you worried that your food would run out before you got the money to buy more. Never true   Within the past 12 months, the food you bought just didn't last and you didn't have money to get more. Never true   Utilities    In the past 12 months has the electric, gas, oil, or water company threatened to shut off services in your home? No            DISCHARGE DETAILS:    Discharge planning discussed with:: sister Maranda Kumari  Freedom of Choice: Yes  Comments - [place] of Choice: S/W pt's sister via T/C;  introduced self and explained role of CM, including arranging DME, STR, home health, out pt tx.  Advised pt/family that CM will make appropriate referrals based on treatment team recommendations and MD orders, and she/pt can consider recommended plan of care and choose from available  vendors.  CM contacted family/caregiver?: Yes  Were Treatment Team discharge recommendations reviewed with patient/caregiver?:  (none at present)          Contacts  Patient Contacts: Maranda Low (Sister)  [phone] (Mobile)  Relationship to Patient:: Family  Contact Method: Phone  Phone Number: Maranda Low (Sister)  [phone] (Mobile)  Reason/Outcome: Continuity of Care, Emergency Contact, Discharge Planning    Requested Home Health Care         Is the patient interested in [facility] at discharge?: Yes  Home Health Discipline requested:: Nursing, Occupational Therapy, Physical Therapy  Home Health Follow-Up Provider:: PCP  Home Health Services Needed:: Evaluate Functional Status and Safety, Gait/ADL Training, Strengthening/Theraputic Exercises to Improve Function, Wound/Ostomy Care  Homebound Criteria Met:: Requires the Assistance of Another Person for Safe Ambulation or to Leave the Home, Uses an Assist Device (i.e. cane, walker, etc)  Supporting Clincal Findings:: Fatigues Easliy in Short Distances, Limited Endurance, Requires Oxygen    DME Referral Provided  Referral made for DME?: No    Other Referral/Resources/Interventions Provided:  Referral Comments: Pt on isolation r/t RSV, ESBL.  T/C to pt's sister May.  Sister reports that she brought pt to [facility] on 3/8 after she had mechanical fall at home two days prior.  Pt remained in the ER until 3/11, when she was transferred to [facility].  Sister says she sat w pt everyday and felt she got very little rehab, which made her weaker.  Sister says pt was kept in bed for the first week of her stay, and subsequent rehab was limited to standing w walker, and occasionally doing up to 10 steps to /, where she would sit all day.  She received a bruise to her buttocks using a bedpan at [facility];  area broke open at [place] and pt now has a pressure injury.  Sister says that pt was much more functional at home prior to fall; amb w RW, transfered independently w  lift chair and RW, received supervision while showering and going up/down stairs.  Sister does not want pt to return to [place], would like pt to come home w home health.  Sister agreeable to discussing d/c plan after pt is seen by PT/OT here;  will see if pt can be safely managed at home w sister or if ongoing rehab will be needed.  CM will continue to follow during hospital stay.         Treatment Team Recommendation: Other (TBD)  Discharge Destination Plan:: Home with Home Health Care, Other (per sister's request)  Transport at Discharge : Other (Comment) (TBD)

## 2025-04-08 NOTE — PROGRESS NOTES
Progress Note - Critical Care/ICU   Name: Genia Call 89 y.o. female I MRN: [id number]  Unit/Bed#: ICU 12 I Date of Admission: 4/7/2025   Date of Service: 4/8/2025 I Hospital Day: 1      Assessment & Plan  Hyponatremia  Appears to be 2/2 hypervolemia-pt with elevated BNP, vascular congestion on CXR, 4+ pitting LE edema  Will give one dose of lasix 40mg IV now, can re-dose in AM   Place ramírez catheter for strict I/O  Check urine lytes, serum and urine osmo  Q 4h Na checks  Hold on IVF  Sodium slowly improving overnight   Goal would be 126-128 by 4/8 1200    Type 2 diabetes mellitus with stage 2 chronic kidney disease, without long-term current use of insulin  (HCC)  Lab Results   Component Value Date    HGBA1C 8.1 (H) 01/16/2025     Check A1c  Hold home metformin and Trulicity  Accuchecks and SSI      Mixed hyperlipidemia  Continue statin and zetia  Hypertension  Continue home BB and ARB  Primary hypothyroidism  Check TSH  Continue Synthroid  Stage 3a chronic kidney disease (HCC)  Lab Results   Component Value Date    EGFR 58 04/08/2025    EGFR 55 04/07/2025    EGFR 51 04/07/2025    CREATININE 0.84 04/08/2025    CREATININE 0.88 04/07/2025    CREATININE 0.94 04/07/2025   Creatine 0.94, GFR 51  Appears to be at baseline  Strict I/O  Trend renal indices  Avoid nephrotoxins  Paroxysmal atrial fibrillation (HCC)  Elevated rates on presentation-likely 2/2 volume overload, now improved   Received 1 dose cardizem in ED  Continue home atenolol  Consider PRN IV metoprolol  Continue Eliquis  RSV (respiratory syncytial virus infection)  Pt recently tested positive for RSV at her rehab facility  Tested positive again today in ED  Supportive care  Acute respiratory failure with hypoxia (HCC)  Pt presented to ED with SOB, had been started on oxygen at the nursing home when she tested positive for RSV  Vascular congestion and pleural effusions noted on imaging, no PE  On 2L NC  Given lasix in ED    Plan:  Will check procal, hold  on abx for now  Monitor I/O  Will check echo, echo from 8/2024 showed EF 61-65% with pericardial effusion  Continue diuresis  Wean oxygen as able for sat>90%  Sacral decubitus ulcer  POA  Consult wound care  Volume overload  Pt presents with volume overload  Continue diuresis with close monitoring of Na, as patient was hyponatremic with Na 118 in ED  Will check Echo  Strict I/O  Pulmonary nodule  Right upper lobe nodule seen on CT scan  Can consider optional CT follow up at 12 months if patient wishes to pursue  Spinal stenosis at L4-L5 level  Severe spinal stenosis at L4-L5 seen on imaging with concern for mass effect on cauda equina nerve roots  Pt without complaints of back pain, complications concerning for cauda equina syndrome  If pain or concern for cauda equina could consider MRI evaluation  Disposition: Med Surg with Telemetry    ICU Core Measures     A: Assess, Prevent, and Manage Pain Has pain been assessed? Yes  Need for changes to pain regimen? No   B: Both SAT/SAT  N/A   C: Choice of Sedation RASS Goal: 0 Alert and Calm  Need for changes to sedation or analgesia regimen? No   D: Delirium CAM-ICU: Unable to perform secondary to Acute cognitive dysfunction   E: Early Mobility  Plan for early mobility? Yes   F: Family Engagement Plan for family engagement today? No       Review of Invasive Devices:    [place] Plan: Continue for accurate I/O monitoring for 48 hours        Prophylaxis:  VTE VTE covered by:  apixaban, Oral, 2.5 mg at 04/07/25 2017       Stress Ulcer  not ordered         24 Hour Events : admitted for hyponatremia, given lasix.  Slow improvement in sodium overnight.  No acute events   Subjective   Review of Systems: See HPI for Review of Systems    Objective :                   Vitals I/O      Most Recent Min/Max in 24hrs   Temp 98.6 °F (37 °C) Temp  Min: 98 °F (36.7 °C)  Max: 99.3 °F (37.4 °C)   Pulse (!) 107 Pulse  Min: 87  Max: 125   Resp 21 Resp  Min: 17  Max: 40   /78 BP  Min: 118/77   Max: 148/88   O2 Sat 99 % SpO2  Min: 93 %  Max: 100 %      Intake/Output Summary (Last 24 hours) at 4/8/2025 0132  Last data filed at 4/8/2025 0016  Gross per 24 hour   Intake 0 ml   Output 690 ml   Net -690 ml       Diet NPO    Invasive Monitoring           Physical Exam   Physical Exam  Eyes:      General: Lids are normal.      Extraocular Movements: Extraocular movements intact.   Skin:     General: Skin is warm and dry.          HENT:      Head: Normocephalic and atraumatic.   Neck:      Trachea: Trachea normal.   Cardiovascular:      Rate and Rhythm: Normal rate.      Pulses: Normal pulses.   Musculoskeletal:      Cervical back: Normal range of motion.      Right lower leg: 3+ Edema present.      Left lower leg: 3+ Edema present.   Abdominal:      Palpations: Abdomen is soft.      Tenderness: There is no abdominal tenderness.   Constitutional:       Appearance: She is ill-appearing.   Pulmonary:      Breath sounds: Decreased breath sounds present.   Neurological:      Mental Status: She is easily aroused. She is lethargic.      GCS: GCS eye subscore is 4. GCS verbal subscore is 5. GCS motor subscore is 6.      Sensory: Sensation is intact.          Diagnostic Studies        Lab Results: I have reviewed the following results:     Medications:  Scheduled PRN   apixaban, 2.5 mg, BID  aspirin, 81 mg, Daily  atenolol, 25 mg, Daily  atorvastatin, 80 mg, Daily  chlorhexidine, 15 mL, Q12H NOEMI  fenofibrate, 145 mg, Daily  insulin lispro, 1-5 Units, Q6H NOEMI  levothyroxine, 150 mcg, Early Morning  losartan, 50 mg, Daily          Continuous          Labs:   CBC    Recent Labs     04/07/25  1228   WBC 13.51*   HGB 12.4   HCT 35.7        BMP    Recent Labs     04/07/25  1636 04/08/25  0013   SODIUM 119* 120*   K 4.6 4.4   CL 82* 84*   CO2 29 27   AGAP 8 9   BUN 32* 32*   CREATININE 0.88 0.84   CALCIUM 9.3 8.7       Coags    Recent Labs     04/07/25  1228   INR 1.60*   PTT 38*        Additional Electrolytes  Recent  Labs     04/07/25  1228   MG 1.8*          Blood Gas    No recent results  No recent results LFTs  Recent Labs     04/07/25  1228   ALT 39   AST 24   ALKPHOS 100   ALB 3.2*   TBILI 1.20*       Infectious  Recent Labs     04/07/25  1636   PROCALCITONI 0.30*     Glucose  Recent Labs     04/07/25  1228 04/07/25  1636 04/08/25  0013   GLUC 282* 233* 156*

## 2025-04-08 NOTE — ASSESSMENT & PLAN NOTE
Appears to be 2/2 hypervolemia-pt with elevated BNP, vascular congestion on CXR, 4+ pitting LE edema  Will give one dose of lasix 40mg IV now, can re-dose in AM   Place ramírez catheter for strict I/O  Check urine lytes, serum and urine osmo  Q 4h Na checks  Hold on IVF  Sodium slowly improving overnight   Goal would be 126-128 by 4/8 1200

## 2025-04-08 NOTE — PLAN OF CARE
Problem: Nutrition/Hydration-ADULT  Goal: Nutrient/Hydration intake appropriate for improving, restoring or maintaining nutritional needs  Description: Monitor and assess patient's nutrition/hydration status for malnutrition. Collaborate with interdisciplinary team and initiate plan and interventions as ordered.  Monitor patient's weight and dietary intake as ordered or per policy. Utilize nutrition screening tool and intervene as necessary. Determine patient's food preferences and provide high-protein, high-caloric foods as appropriate. INTERVENTIONS:- Monitor oral intake, urinary output, labs, and treatment plans- Assess nutrition and hydration status and recommend course of action- Evaluate amount of meals eaten- Assist patient with eating if necessary - Allow adequate time for meals- Recommend/ encourage appropriate diets, oral nutritional supplements, and vitamin/mineral supplements- Order, calculate, and assess calorie counts as needed- Recommend, monitor, and adjust tube feedings and TPN/PPN based on assessed needs- Assess need for intravenous fluids- Provide specific nutrition/hydration education as appropriate- Include patient/family/caregiver in decisions related to nutrition  Monitor and assess patient's nutrition/hydration status for malnutrition. Collaborate with interdisciplinary team and initiate plan and interventions as ordered.  Monitor patient's weight and dietary intake as ordered or per policy. Utilize nutrition screening tool and intervene as necessary. Determine patient's food preferences and provide high-protein, high-caloric foods as appropriate. INTERVENTIONS:- Monitor oral intake, urinary output, labs, and treatment plans- Assess nutrition and hydration status and recommend course of action- Evaluate amount of meals eaten- Assist patient with eating if necessary - Allow adequate time for meals- Recommend/ encourage appropriate diets, oral nutritional supplements, and vitamin/mineral  supplements- Order, calculate, and assess calorie counts as needed- Recommend, monitor, and adjust tube feedings and TPN/PPN based on assessed needs- Assess need for intravenous fluids- Provide specific nutrition/hydration education as appropriate- Include patient/family/caregiver in decisions related to nutrition  Outcome: Progressing     Problem: PAIN - ADULT  Goal: Verbalizes/displays adequate comfort level or baseline comfort level  Description: Interventions:- Encourage patient to monitor pain and request assistance- Assess pain using appropriate pain scale- Administer analgesics based on type and severity of pain and evaluate response- Implement non-pharmacological measures as appropriate and evaluate response- Consider cultural and social influences on pain and pain management- Notify physician/advanced practitioner if interventions unsuccessful or patient reports new pain  Outcome: Progressing     Problem: METABOLIC, FLUID AND ELECTROLYTES - ADULT  Goal: Electrolytes maintained within normal limits  Description: INTERVENTIONS:- Monitor labs and assess patient for signs and symptoms of electrolyte imbalances- Administer electrolyte replacement as ordered- Monitor response to electrolyte replacements, including repeat lab results as appropriate- Instruct patient on fluid and nutrition as appropriate  Outcome: Progressing  Goal: Fluid balance maintained  Description: INTERVENTIONS:- Monitor labs - Monitor I/O and WT- Instruct patient on fluid and nutrition as appropriate- Assess for signs & symptoms of volume excess or deficit  Outcome: Progressing  Goal: Glucose maintained within target range  Description: INTERVENTIONS:- Monitor Blood Glucose as ordered- Assess for signs and symptoms of hyperglycemia and hypoglycemia- Administer ordered medications to maintain glucose within target range- Assess nutritional intake and initiate nutrition service referral as needed  Outcome: Progressing

## 2025-04-09 ENCOUNTER — HOME CARE VISIT (OUTPATIENT)
Dept: HOME HEALTH SERVICES | Facility: HOME HEALTHCARE | Age: OVER 89
End: 2025-04-09
Payer: MEDICARE

## 2025-04-09 ENCOUNTER — HOSPICE ADMISSION (OUTPATIENT)
Dept: HOME HOSPICE | Facility: HOSPICE | Age: OVER 89
End: 2025-04-09
Payer: MEDICARE

## 2025-04-09 PROBLEM — I42.9 CARDIOMYOPATHY (HCC): Status: ACTIVE | Noted: 2025-04-09

## 2025-04-09 PROBLEM — Z95.0 PACEMAKER: Status: ACTIVE | Noted: 2025-04-09

## 2025-04-09 PROBLEM — I25.10 CAD S/P PERCUTANEOUS CORONARY ANGIOPLASTY: Status: ACTIVE | Noted: 2025-04-09

## 2025-04-09 PROBLEM — I50.21 ACUTE HFREF (HEART FAILURE WITH REDUCED EJECTION FRACTION) (HCC): Status: ACTIVE | Noted: 2025-04-07

## 2025-04-09 PROBLEM — Z51.5 COMFORT MEASURES ONLY STATUS: Status: ACTIVE | Noted: 2025-04-09

## 2025-04-09 PROBLEM — Z51.5 PALLIATIVE CARE PATIENT: Status: ACTIVE | Noted: 2025-04-09

## 2025-04-09 PROBLEM — Z98.61 CAD S/P PERCUTANEOUS CORONARY ANGIOPLASTY: Status: ACTIVE | Noted: 2025-04-09

## 2025-04-09 PROBLEM — I48.92 ATRIAL FLUTTER (HCC): Status: ACTIVE | Noted: 2025-04-07

## 2025-04-09 PROBLEM — Z71.89 GOALS OF CARE, COUNSELING/DISCUSSION: Status: ACTIVE | Noted: 2025-04-09

## 2025-04-09 LAB
ANION GAP SERPL CALCULATED.3IONS-SCNC: 8 MMOL/L (ref 4–13)
ANION GAP SERPL CALCULATED.3IONS-SCNC: 8 MMOL/L (ref 4–13)
BUN SERPL-MCNC: 34 MG/DL (ref 5–25)
BUN SERPL-MCNC: 39 MG/DL (ref 5–25)
CALCIUM SERPL-MCNC: 7.3 MG/DL (ref 8.4–10.2)
CALCIUM SERPL-MCNC: 8.4 MG/DL (ref 8.4–10.2)
CHLORIDE SERPL-SCNC: 89 MMOL/L (ref 96–108)
CHLORIDE SERPL-SCNC: 91 MMOL/L (ref 96–108)
CO2 SERPL-SCNC: 26 MMOL/L (ref 21–32)
CO2 SERPL-SCNC: 28 MMOL/L (ref 21–32)
CREAT SERPL-MCNC: 1.2 MG/DL (ref 0.6–1.3)
CREAT SERPL-MCNC: 1.37 MG/DL (ref 0.6–1.3)
ERYTHROCYTE [DISTWIDTH] IN BLOOD BY AUTOMATED COUNT: 15.1 % (ref 11.6–15.1)
GFR SERPL CREATININE-BSD FRML MDRD: 32 ML/MIN/1.73SQ M
GFR SERPL CREATININE-BSD FRML MDRD: 38 ML/MIN/1.73SQ M
GLUCOSE SERPL-MCNC: 107 MG/DL (ref 65–140)
GLUCOSE SERPL-MCNC: 151 MG/DL (ref 65–140)
GLUCOSE SERPL-MCNC: 180 MG/DL (ref 65–140)
GLUCOSE SERPL-MCNC: 75 MG/DL (ref 65–140)
GLUCOSE SERPL-MCNC: 80 MG/DL (ref 65–140)
GLUCOSE SERPL-MCNC: 95 MG/DL (ref 65–140)
HCT VFR BLD AUTO: 34 % (ref 34.8–46.1)
HGB BLD-MCNC: 11.6 G/DL (ref 11.5–15.4)
MAGNESIUM SERPL-MCNC: 1.7 MG/DL (ref 1.9–2.7)
MCH RBC QN AUTO: 32 PG (ref 26.8–34.3)
MCHC RBC AUTO-ENTMCNC: 34.1 G/DL (ref 31.4–37.4)
MCV RBC AUTO: 94 FL (ref 82–98)
PLATELET # BLD AUTO: 342 THOUSANDS/UL (ref 149–390)
PMV BLD AUTO: 10.7 FL (ref 8.9–12.7)
POTASSIUM SERPL-SCNC: 4.6 MMOL/L (ref 3.5–5.3)
POTASSIUM SERPL-SCNC: 4.7 MMOL/L (ref 3.5–5.3)
RBC # BLD AUTO: 3.62 MILLION/UL (ref 3.81–5.12)
SODIUM SERPL-SCNC: 125 MMOL/L (ref 135–147)
SODIUM SERPL-SCNC: 125 MMOL/L (ref 135–147)
WBC # BLD AUTO: 11.89 THOUSAND/UL (ref 4.31–10.16)

## 2025-04-09 PROCEDURE — 99223 1ST HOSP IP/OBS HIGH 75: CPT | Performed by: NURSE PRACTITIONER

## 2025-04-09 PROCEDURE — 97163 PT EVAL HIGH COMPLEX 45 MIN: CPT

## 2025-04-09 PROCEDURE — 97167 OT EVAL HIGH COMPLEX 60 MIN: CPT

## 2025-04-09 PROCEDURE — 85027 COMPLETE CBC AUTOMATED: CPT | Performed by: NURSE PRACTITIONER

## 2025-04-09 PROCEDURE — 82948 REAGENT STRIP/BLOOD GLUCOSE: CPT

## 2025-04-09 PROCEDURE — 99233 SBSQ HOSP IP/OBS HIGH 50: CPT | Performed by: STUDENT IN AN ORGANIZED HEALTH CARE EDUCATION/TRAINING PROGRAM

## 2025-04-09 PROCEDURE — 80048 BASIC METABOLIC PNL TOTAL CA: CPT | Performed by: NURSE PRACTITIONER

## 2025-04-09 PROCEDURE — 83735 ASSAY OF MAGNESIUM: CPT | Performed by: NURSE PRACTITIONER

## 2025-04-09 RX ORDER — LORAZEPAM 1 MG/1
1 TABLET ORAL EVERY 6 HOURS PRN
Qty: 30 TABLET | Refills: 0 | Status: SHIPPED | OUTPATIENT
Start: 2025-04-09 | End: 2025-04-11

## 2025-04-09 RX ORDER — MORPHINE SULFATE 20 MG/ML
10 SOLUTION ORAL EVERY 2 HOUR PRN
Qty: 30 ML | Refills: 0 | Status: SHIPPED | OUTPATIENT
Start: 2025-04-09 | End: 2025-04-11

## 2025-04-09 RX ORDER — HALOPERIDOL 2 MG/ML
2 SOLUTION ORAL EVERY 6 HOURS PRN
Qty: 15 ML | Refills: 0 | Status: SHIPPED | OUTPATIENT
Start: 2025-04-09 | End: 2025-04-11

## 2025-04-09 RX ORDER — LIDOCAINE 50 MG/G
2 PATCH TOPICAL DAILY
Status: DISCONTINUED | OUTPATIENT
Start: 2025-04-09 | End: 2025-04-10 | Stop reason: HOSPADM

## 2025-04-09 RX ORDER — MAGNESIUM SULFATE HEPTAHYDRATE 40 MG/ML
2 INJECTION, SOLUTION INTRAVENOUS ONCE
Status: COMPLETED | OUTPATIENT
Start: 2025-04-09 | End: 2025-04-09

## 2025-04-09 RX ORDER — DEXTROSE MONOHYDRATE 25 G/50ML
INJECTION, SOLUTION INTRAVENOUS
Status: COMPLETED
Start: 2025-04-09 | End: 2025-04-09

## 2025-04-09 RX ORDER — ACETAMINOPHEN 325 MG/1
650 TABLET ORAL EVERY 6 HOURS PRN
Status: DISCONTINUED | OUTPATIENT
Start: 2025-04-09 | End: 2025-04-10 | Stop reason: HOSPADM

## 2025-04-09 RX ADMIN — FENOFIBRATE 145 MG: 145 TABLET, FILM COATED ORAL at 08:44

## 2025-04-09 RX ADMIN — ACETAMINOPHEN 650 MG: 325 TABLET, FILM COATED ORAL at 08:47

## 2025-04-09 RX ADMIN — CHLORHEXIDINE GLUCONATE 0.12% ORAL RINSE 15 ML: 1.2 LIQUID ORAL at 08:44

## 2025-04-09 RX ADMIN — LIDOCAINE 5% 2 PATCH: 700 PATCH TOPICAL at 08:44

## 2025-04-09 RX ADMIN — INSULIN LISPRO 1 UNITS: 100 INJECTION, SOLUTION INTRAVENOUS; SUBCUTANEOUS at 14:16

## 2025-04-09 RX ADMIN — ATORVASTATIN CALCIUM 80 MG: 40 TABLET, FILM COATED ORAL at 08:44

## 2025-04-09 RX ADMIN — ASPIRIN 81 MG: 81 TABLET, COATED ORAL at 08:44

## 2025-04-09 RX ADMIN — ACETAMINOPHEN 650 MG: 325 TABLET, FILM COATED ORAL at 20:22

## 2025-04-09 RX ADMIN — APIXABAN 2.5 MG: 2.5 TABLET, FILM COATED ORAL at 08:44

## 2025-04-09 RX ADMIN — NOREPINEPHRINE BITARTRATE 3 MCG/MIN: 1 INJECTION, SOLUTION, CONCENTRATE INTRAVENOUS at 10:56

## 2025-04-09 RX ADMIN — MAGNESIUM SULFATE HEPTAHYDRATE 2 G: 40 INJECTION, SOLUTION INTRAVENOUS at 07:44

## 2025-04-09 RX ADMIN — DEXTROSE MONOHYDRATE 25 ML: 25 INJECTION, SOLUTION INTRAVENOUS at 00:39

## 2025-04-09 NOTE — PLAN OF CARE
Problem: Potential for Falls  Goal: Patient will remain free of falls  Description: INTERVENTIONS:- Educate patient/family on patient safety including physical limitations- Instruct patient to call for assistance with activity - Consult OT/PT to assist with strengthening/mobility - Keep Call bell within reach- Keep bed low and locked with side rails adjusted as appropriate- Keep care items and personal belongings within reach- Initiate and maintain comfort rounds- Make Fall Risk Sign visible to staff- Offer Toileting every 2 Hours, in advance of need- Initiate/Maintain bed alarm- Obtain necessary fall risk management equipment: bed alarm- Apply yellow socks and bracelet for high fall risk patients- Consider moving patient to room near nurses station  INTERVENTIONS:- Educate patient/family on patient safety including physical limitations- Instruct patient to call for assistance with activity - Consult OT/PT to assist with strengthening/mobility - Keep Call bell within reach- Keep bed low and locked with side rails adjusted as appropriate- Keep care items and personal belongings within reach- Initiate and maintain comfort rounds- Make Fall Risk Sign visible to staff- Offer Toileting every 2 Hours, in advance of need- Initiate/Maintain bed alarm- Obtain necessary fall risk management equipment: bed alarm- Apply yellow socks and bracelet for high fall risk patients- Consider moving patient to room near nurses station  Outcome: Progressing     Problem: Prexisting or High Potential for Compromised Skin Integrity  Goal: Skin integrity is maintained or improved  Description: INTERVENTIONS:- Identify patients at risk for skin breakdown- Assess and monitor skin integrity- Assess and monitor nutrition and hydration status- Monitor labs - Assess for incontinence - Turn and reposition patient- Assist with mobility/ambulation- Relieve pressure over bony prominences- Avoid friction and shearing- Provide appropriate hygiene as  needed including keeping skin clean and dry- Evaluate need for skin moisturizer/barrier cream- Collaborate with interdisciplinary team - Patient/family teaching- Consider wound care consult   Outcome: Progressing     Problem: Nutrition/Hydration-ADULT  Goal: Nutrient/Hydration intake appropriate for improving, restoring or maintaining nutritional needs  Description: Monitor and assess patient's nutrition/hydration status for malnutrition. Collaborate with interdisciplinary team and initiate plan and interventions as ordered.  Monitor patient's weight and dietary intake as ordered or per policy. Utilize nutrition screening tool and intervene as necessary. Determine patient's food preferences and provide high-protein, high-caloric foods as appropriate. INTERVENTIONS:- Monitor oral intake, urinary output, labs, and treatment plans- Assess nutrition and hydration status and recommend course of action- Evaluate amount of meals eaten- Assist patient with eating if necessary - Allow adequate time for meals- Recommend/ encourage appropriate diets, oral nutritional supplements, and vitamin/mineral supplements- Order, calculate, and assess calorie counts as needed- Recommend, monitor, and adjust tube feedings and TPN/PPN based on assessed needs- Assess need for intravenous fluids- Provide specific nutrition/hydration education as appropriate- Include patient/family/caregiver in decisions related to nutrition  Monitor and assess patient's nutrition/hydration status for malnutrition. Collaborate with interdisciplinary team and initiate plan and interventions as ordered.  Monitor patient's weight and dietary intake as ordered or per policy. Utilize nutrition screening tool and intervene as necessary. Determine patient's food preferences and provide high-protein, high-caloric foods as appropriate. INTERVENTIONS:- Monitor oral intake, urinary output, labs, and treatment plans- Assess nutrition and hydration status and recommend  course of action- Evaluate amount of meals eaten- Assist patient with eating if necessary - Allow adequate time for meals- Recommend/ encourage appropriate diets, oral nutritional supplements, and vitamin/mineral supplements- Order, calculate, and assess calorie counts as needed- Recommend, monitor, and adjust tube feedings and TPN/PPN based on assessed needs- Assess need for intravenous fluids- Provide specific nutrition/hydration education as appropriate- Include patient/family/caregiver in decisions related to nutrition  Outcome: Progressing     Problem: PAIN - ADULT  Goal: Verbalizes/displays adequate comfort level or baseline comfort level  Description: Interventions:- Encourage patient to monitor pain and request assistance- Assess pain using appropriate pain scale- Administer analgesics based on type and severity of pain and evaluate response- Implement non-pharmacological measures as appropriate and evaluate response- Consider cultural and social influences on pain and pain management- Notify physician/advanced practitioner if interventions unsuccessful or patient reports new pain  Outcome: Progressing     Problem: INFECTION - ADULT  Goal: Absence or prevention of progression during hospitalization  Description: INTERVENTIONS:- Assess and monitor for signs and symptoms of infection- Monitor lab/diagnostic results- Monitor all insertion sites, i.e. indwelling lines, tubes, and drains- Monitor endotracheal if appropriate and nasal secretions for changes in amount and color- [place] appropriate cooling/warming therapies per order- Administer medications as ordered- Instruct and encourage patient and family to use good hand hygiene technique- Identify and instruct in appropriate isolation precautions for identified infection/condition  Outcome: Progressing  Goal: Absence of fever/infection during neutropenic period  Description: INTERVENTIONS:- Monitor WBC  Outcome: Progressing     Problem: SAFETY ADULT  Goal:  Patient will remain free of falls  Description: INTERVENTIONS:- Educate patient/family on patient safety including physical limitations- Instruct patient to call for assistance with activity - Consult OT/PT to assist with strengthening/mobility - Keep Call bell within reach- Keep bed low and locked with side rails adjusted as appropriate- Keep care items and personal belongings within reach- Initiate and maintain comfort rounds- Make Fall Risk Sign visible to staff- Offer Toileting every 2 Hours, in advance of need- Initiate/Maintain bed alarm- Obtain necessary fall risk management equipment: bed alarm- Apply yellow socks and bracelet for high fall risk patients- Consider moving patient to room near nurses station  INTERVENTIONS:- Educate patient/family on patient safety including physical limitations- Instruct patient to call for assistance with activity - Consult OT/PT to assist with strengthening/mobility - Keep Call bell within reach- Keep bed low and locked with side rails adjusted as appropriate- Keep care items and personal belongings within reach- Initiate and maintain comfort rounds- Make Fall Risk Sign visible to staff- Offer Toileting every 2 Hours, in advance of need- Initiate/Maintain bed alarm- Obtain necessary fall risk management equipment: bed alarm- Apply yellow socks and bracelet for high fall risk patients- Consider moving patient to room near nurses station  Outcome: Progressing  Goal: Maintain or return to baseline ADL function  Description: INTERVENTIONS:-  Assess patient's ability to carry out ADLs; assess patient's baseline for ADL function and identify physical deficits which impact ability to perform ADLs (bathing, care of mouth/teeth, toileting, grooming, dressing, etc.)- Assess/evaluate cause of self-care deficits - Assess range of motion- Assess patient's mobility; develop plan if impaired- Assess patient's need for assistive devices and provide as appropriate- Encourage maximum  independence but intervene and supervise when necessary- Involve family in performance of ADLs- Assess for home care needs following discharge - Consider OT consult to assist with ADL evaluation and planning for discharge- Provide patient education as appropriate  Outcome: Progressing  Goal: Maintains/Returns to pre admission functional level  Description: INTERVENTIONS:- Perform AM-PAC 6 Click Basic Mobility/ Daily Activity assessment daily.- Set and communicate daily mobility goal to care team and patient/family/caregiver. - Collaborate with rehabilitation services on mobility goals if consulted- Perform Range of Motion 4 times a day.- Reposition patient every 3 hours.- Dangle patient 3 times a day- Stand patient 3 times a day- Ambulate patient 3 times a day- Out of bed to chair 3 times a day - Out of bed for meals 3 times a day- Out of bed for toileting- Record patient progress and toleration of activity level   Outcome: Progressing     Problem: DISCHARGE PLANNING  Goal: Discharge to home or other facility with appropriate resources  Description: INTERVENTIONS:- Identify barriers to discharge w/patient and caregiver- Arrange for needed discharge resources and transportation as appropriate- Identify discharge learning needs (meds, wound care, etc.)- Arrange for interpretive services to assist at discharge as needed- Refer to Case Management Department for coordinating discharge planning if the patient needs post-hospital services based on physician/advanced practitioner order or complex needs related to functional status, cognitive ability, or social support system  Outcome: Progressing     Problem: Knowledge Deficit  Goal: Patient/family/caregiver demonstrates understanding of disease process, treatment plan, medications, and discharge instructions  Description: Complete learning assessment and assess knowledge base.Interventions:- Provide teaching at level of understanding- Provide teaching via preferred  learning methods  Outcome: Progressing     Problem: NEUROSENSORY - ADULT  Goal: Achieves stable or improved neurological status  Description: INTERVENTIONS- Monitor and report changes in neurological status- Monitor vital signs such as temperature, blood pressure, glucose, and any other labs ordered - Initiate measures to prevent increased intracranial pressure- Monitor for seizure activity and implement precautions if appropriate    Outcome: Progressing  Goal: Remains free of injury related to seizures activity  Description: INTERVENTIONS- Maintain airway, patient safety  and administer oxygen as ordered- Monitor patient for seizure activity, document and report duration and description of seizure to physician/advanced practitioner- If seizure occurs,  ensure patient safety during seizure- Reorient patient post seizure- Seizure pads on all 4 side rails- Instruct patient/family to notify RN of any seizure activity including if an aura is experienced- Instruct patient/family to call for assistance with activity based on nursing assessment- Administer anti-seizure medications if ordered  Outcome: Progressing  Goal: Achieves maximal functionality and self care  Description: INTERVENTIONS- Monitor swallowing and airway patency with patient fatigue and changes in neurological status- Encourage and assist patient to increase activity and self care. - Encourage visually impaired, hearing impaired and aphasic patients to use assistive/communication devices  Outcome: Progressing     Problem: GENITOURINARY - ADULT  Goal: Maintains or returns to baseline urinary function  Description: INTERVENTIONS:- Assess urinary function- Encourage oral fluids to ensure adequate hydration if ordered- Administer IV fluids as ordered to ensure adequate hydration- Administer ordered medications as needed- Offer frequent toileting- Follow urinary retention protocol if ordered  Outcome: Progressing  Goal: Absence of urinary  retention  Description: INTERVENTIONS:- Assess patient’s ability to void and empty bladder- Monitor I/O- Bladder scan as needed- Discuss with physician/AP medications to alleviate retention as needed- Discuss catheterization for long term situations as appropriate  Outcome: Progressing  Goal: Urinary catheter remains patent  Description: INTERVENTIONS:- Assess patency of urinary catheter- If patient has a chronic ramírez, consider changing catheter if non-functioning- Follow guidelines for intermittent irrigation of non-functioning urinary catheter  Outcome: Progressing     Problem: METABOLIC, FLUID AND ELECTROLYTES - ADULT  Goal: Electrolytes maintained within normal limits  Description: INTERVENTIONS:- Monitor labs and assess patient for signs and symptoms of electrolyte imbalances- Administer electrolyte replacement as ordered- Monitor response to electrolyte replacements, including repeat lab results as appropriate- Instruct patient on fluid and nutrition as appropriate  Outcome: Progressing  Goal: Fluid balance maintained  Description: INTERVENTIONS:- Monitor labs - Monitor I/O and WT- Instruct patient on fluid and nutrition as appropriate- Assess for signs & symptoms of volume excess or deficit  Outcome: Progressing  Goal: Glucose maintained within target range  Description: INTERVENTIONS:- Monitor Blood Glucose as ordered- Assess for signs and symptoms of hyperglycemia and hypoglycemia- Administer ordered medications to maintain glucose within target range- Assess nutritional intake and initiate nutrition service referral as needed  Outcome: Progressing

## 2025-04-09 NOTE — ACP (ADVANCE CARE PLANNING)
Critical Care Advanced Care Planning Note  Genia Call 89 y.o. female MRN: [id number]  Unit/Bed#: ICU 12 Encounter: [id number]    Genia Call is a 89 y.o. female requiring critical care evaluation and advanced care planning. The patient has chronic comorbidities, including but not limited to CKD3, hypothyroidism, HFpEF, paroxysmal Afib, s/p PPM which is now further complicated by the following acute conditions: new reduced biventricular function, volume overload, poor response to diuretic, hypotension, hyponatremia.  Due to the severity of the patient's acute condition and/or the extent of chronic conditions, additional conversations pertaining to advanced care planning were required. Today's discussion, which was held in a face-to-face meeting, included  sister May and other sister , and it was established that all stake holders understood the rationale for the advanced care planning. The patient was unable to participate in the discussion due to lethargy.    Summary of Discussion:  TTE today with newly reduced LVEF to 27%, RV dysfunction, mod MR, mod TR. More likely subacute process with neg trop and increasing LE swelling over several weeks. Remains volume overloaded with poor response to Lasix and is now hypotensive with increasing drowsiness. She may benefit from diuresis with inotropic support however unlikely will return to her baseline functional status she was at several weeks prior without more aggressive measures incl central line, multiple vasoactive infusions (epi/milrinone, norepi). Both sisters expressed she would not have wanted such aggressive measures and it would be more important to her to be comfortable if it was the end. Will try low dose pressor support (norepi up to 5 mcg/min) peripherally to see this helps diurese her a bit, but if persistent hypotension, no response or decline in kidney function, sx of pain/dyspnea, they would like to transition to comfort-focused care. All  questions/concerns answered.    Total time spent, (15) minutes (15:30 to 15:45).      CODE STATUS: DNR/DNI - Level 3    SIGNATURE: Cindy Johnston DO  DATE: April 8, 2025  TIME: 4:13 PM

## 2025-04-09 NOTE — SOCIAL WORK
Palliative Inpatient Assessment Note    LSW appreciates the opportunity to provide patient/family with inpatient emotional support and guidance while they continue to receive medical attention from a Palliative provider.    LSW completed an assessment of need which was completed with pt and her sister May.  Discussed hospice care and pt would like to move forward with referral.    Relationship status: , 12 years  Duration of relationship:   Name of significant other: Jasbir  Children and Ages:   Pets:   Other important family information:  Pt is the oldest of six, 2 sisters alive.  Living situation (place and with whom): resides with younger sister, May and 2 adult nephews    Patient's primary caregiver:  sister  Any limitations: weak  Environmental concerns or barriers:   history:  Employment history/ Source of income:   Disability:   Concerns regarding literacy:   Spirituality/ Worship:    Cultural information:   Mental Health and/or Drug and Alcohol history:  Advanced Directives:  Level 3  Patient's strengths, social supports, and resources: supportive family  Patient/family current level of coping:    Level of understanding: pt able to repeat back understanding  Patient/family concerns and areas of need: hospice referral    I have spent 45 minutes with Patient and family today in which greater than 50% of this time was spent in counseling/coordination of care regarding Counseling / Coordination of care, Obtaining or reviewing history  , and Communicating with other healthcare professionals .
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
## 2025-04-09 NOTE — HOSPICE NOTE
Hospice referral received this afternoon.  Patient is approve for routine/home hospice services.  Liaison met with patient, sister May and nephtsering Koch at bedside.  Hospice care and services explained and questions answered.  DME (Hospital bed, OBT, PRN O2 and transport chair) ordered for delivery by 11:00 tomorrow AM.  We can open to hospice tomorrow at home if transport arranged by noon.  Requested scripts to be sent by palliative CRNP.  Keep hospice updated with DC time.

## 2025-04-09 NOTE — PLAN OF CARE
Problem: Potential for Falls  Goal: Patient will remain free of falls  Description: INTERVENTIONS:- Educate patient/family on patient safety including physical limitations- Instruct patient to call for assistance with activity - Consult OT/PT to assist with strengthening/mobility - Keep Call bell within reach- Keep bed low and locked with side rails adjusted as appropriate- Keep care items and personal belongings within reach- Initiate and maintain comfort rounds- Make Fall Risk Sign visible to staff- Offer Toileting every 2 Hours, in advance of need- Initiate/Maintain bed alarm- Obtain necessary fall risk management equipment:   INTERVENTIONS:- Educate patient/family on patient safety including physical limitations- Instruct patient to call for assistance with activity - Consult OT/PT to assist with strengthening/mobility - Keep Call bell within reach- Keep bed low and locked with side rails adjusted as appropriate- Keep care items and personal belongings within reach- Initiate and maintain comfort rounds- Make Fall Risk Sign visible to staff- Offer Toileting every 2 Hours, in advance of need- Initiate/Maintain bed alarm- Obtain necessary fall risk management equipment:   Outcome: Progressing     Problem: Prexisting or High Potential for Compromised Skin Integrity  Goal: Skin integrity is maintained or improved  Description: INTERVENTIONS:- Identify patients at risk for skin breakdown- Assess and monitor skin integrity- Assess and monitor nutrition and hydration status- Monitor labs - Assess for incontinence - Turn and reposition patient- Assist with mobility/ambulation- Relieve pressure over bony prominences- Avoid friction and shearing- Provide appropriate hygiene as needed including keeping skin clean and dry- Evaluate need for skin moisturizer/barrier cream- Collaborate with interdisciplinary team - Patient/family teaching- Consider wound care consult   Outcome: Progressing     Problem:  Nutrition/Hydration-ADULT  Goal: Nutrient/Hydration intake appropriate for improving, restoring or maintaining nutritional needs  Description: Monitor and assess patient's nutrition/hydration status for malnutrition. Collaborate with interdisciplinary team and initiate plan and interventions as ordered.  Monitor patient's weight and dietary intake as ordered or per policy. Utilize nutrition screening tool and intervene as necessary. Determine patient's food preferences and provide high-protein, high-caloric foods as appropriate. INTERVENTIONS:- Monitor oral intake, urinary output, labs, and treatment plans- Assess nutrition and hydration status and recommend course of action- Evaluate amount of meals eaten- Assist patient with eating if necessary - Allow adequate time for meals- Recommend/ encourage appropriate diets, oral nutritional supplements, and vitamin/mineral supplements- Order, calculate, and assess calorie counts as needed- Recommend, monitor, and adjust tube feedings and TPN/PPN based on assessed needs- Assess need for intravenous fluids- Provide specific nutrition/hydration education as appropriate- Include patient/family/caregiver in decisions related to nutrition  Monitor and assess patient's nutrition/hydration status for malnutrition. Collaborate with interdisciplinary team and initiate plan and interventions as ordered.  Monitor patient's weight and dietary intake as ordered or per policy. Utilize nutrition screening tool and intervene as necessary. Determine patient's food preferences and provide high-protein, high-caloric foods as appropriate. INTERVENTIONS:- Monitor oral intake, urinary output, labs, and treatment plans- Assess nutrition and hydration status and recommend course of action- Evaluate amount of meals eaten- Assist patient with eating if necessary - Allow adequate time for meals- Recommend/ encourage appropriate diets, oral nutritional supplements, and vitamin/mineral supplements-  Order, calculate, and assess calorie counts as needed- Recommend, monitor, and adjust tube feedings and TPN/PPN based on assessed needs- Assess need for intravenous fluids- Provide specific nutrition/hydration education as appropriate- Include patient/family/caregiver in decisions related to nutrition  Outcome: Progressing     Problem: PAIN - ADULT  Goal: Verbalizes/displays adequate comfort level or baseline comfort level  Description: Interventions:- Encourage patient to monitor pain and request assistance- Assess pain using appropriate pain scale- Administer analgesics based on type and severity of pain and evaluate response- Implement non-pharmacological measures as appropriate and evaluate response- Consider cultural and social influences on pain and pain management- Notify physician/advanced practitioner if interventions unsuccessful or patient reports new pain  Outcome: Progressing     Problem: INFECTION - ADULT  Goal: Absence or prevention of progression during hospitalization  Description: INTERVENTIONS:- Assess and monitor for signs and symptoms of infection- Monitor lab/diagnostic results- Monitor all insertion sites, i.e. indwelling lines, tubes, and drains- Monitor endotracheal if appropriate and nasal secretions for changes in amount and color- [place] appropriate cooling/warming therapies per order- Administer medications as ordered- Instruct and encourage patient and family to use good hand hygiene technique- Identify and instruct in appropriate isolation precautions for identified infection/condition  Outcome: Progressing  Goal: Absence of fever/infection during neutropenic period  Description: INTERVENTIONS:- Monitor WBC  Outcome: Progressing     Problem: SAFETY ADULT  Goal: Patient will remain free of falls  Description: INTERVENTIONS:- Educate patient/family on patient safety including physical limitations- Instruct patient to call for assistance with activity - Consult OT/PT to assist with  strengthening/mobility - Keep Call bell within reach- Keep bed low and locked with side rails adjusted as appropriate- Keep care items and personal belongings within reach- Initiate and maintain comfort rounds- Make Fall Risk Sign visible to staff- Offer Toileting every 2 Hours, in advance of need- Initiate/Maintain bed alarm- Obtain necessary fall risk management equipment:   INTERVENTIONS:- Educate patient/family on patient safety including physical limitations- Instruct patient to call for assistance with activity - Consult OT/PT to assist with strengthening/mobility - Keep Call bell within reach- Keep bed low and locked with side rails adjusted as appropriate- Keep care items and personal belongings within reach- Initiate and maintain comfort rounds- Make Fall Risk Sign visible to staff- Offer Toileting every 2 Hours, in advance of need- Initiate/Maintain bed alarm- Obtain necessary fall risk management equipment:   Goal: Maintain or return to baseline ADL function  Description: INTERVENTIONS:-  Assess patient's ability to carry out ADLs; assess patient's baseline for ADL function and identify physical deficits which impact ability to perform ADLs (bathing, care of mouth/teeth, toileting, grooming, dressing, etc.)- Assess/evaluate cause of self-care deficits - Assess range of motion- Assess patient's mobility; develop plan if impaired- Assess patient's need for assistive devices and provide as appropriate- Encourage maximum independence but intervene and supervise when necessary- Involve family in performance of ADLs- Assess for home care needs following discharge - Consider OT consult to assist with ADL evaluation and planning for discharge- Provide patient education as appropriate  Outcome: Progressing  Goal: Maintains/Returns to pre admission functional level  Description: INTERVENTIONS:- Perform AM-PAC 6 Click Basic Mobility/ Daily Activity assessment daily.- Set and communicate daily mobility goal to care  team and patient/family/caregiver. - Collaborate with rehabilitation services on mobility goals if consulted- Perform Range of Motion 2 times a day.- Reposition patient every 2 hours.- Dangle patient 2 times a day- Stand patient 2 times a day- Ambulate patient 2 times a day- Out of bed to chair 2 times a day - Out of bed for meals 2 times a day- Out of bed for toileting- Record patient progress and toleration of activity level   Outcome: Progressing     Problem: DISCHARGE PLANNING  Goal: Discharge to home or other facility with appropriate resources  Description: INTERVENTIONS:- Identify barriers to discharge w/patient and caregiver- Arrange for needed discharge resources and transportation as appropriate- Identify discharge learning needs (meds, wound care, etc.)- Arrange for interpretive services to assist at discharge as needed- Refer to Case Management Department for coordinating discharge planning if the patient needs post-hospital services based on physician/advanced practitioner order or complex needs related to functional status, cognitive ability, or social support system  Outcome: Progressing     Problem: Knowledge Deficit  Goal: Patient/family/caregiver demonstrates understanding of disease process, treatment plan, medications, and discharge instructions  Description: Complete learning assessment and assess knowledge base.Interventions:- Provide teaching at level of understanding- Provide teaching via preferred learning methods  Outcome: Progressing     Problem: NEUROSENSORY - ADULT  Goal: Achieves stable or improved neurological status  Description: INTERVENTIONS- Monitor and report changes in neurological status- Monitor vital signs such as temperature, blood pressure, glucose, and any other labs ordered - Initiate measures to prevent increased intracranial pressure- Monitor for seizure activity and implement precautions if appropriate    Outcome: Progressing  Goal: Remains free of injury related to  seizures activity  Description: INTERVENTIONS- Maintain airway, patient safety  and administer oxygen as ordered- Monitor patient for seizure activity, document and report duration and description of seizure to physician/advanced practitioner- If seizure occurs,  ensure patient safety during seizure- Reorient patient post seizure- Seizure pads on all 4 side rails- Instruct patient/family to notify RN of any seizure activity including if an aura is experienced- Instruct patient/family to call for assistance with activity based on nursing assessment- Administer anti-seizure medications if ordered  Outcome: Progressing  Goal: Achieves maximal functionality and self care  Description: INTERVENTIONS- Monitor swallowing and airway patency with patient fatigue and changes in neurological status- Encourage and assist patient to increase activity and self care. - Encourage visually impaired, hearing impaired and aphasic patients to use assistive/communication devices  Outcome: Progressing     Problem: GENITOURINARY - ADULT  Goal: Maintains or returns to baseline urinary function  Description: INTERVENTIONS:- Assess urinary function- Encourage oral fluids to ensure adequate hydration if ordered- Administer IV fluids as ordered to ensure adequate hydration- Administer ordered medications as needed- Offer frequent toileting- Follow urinary retention protocol if ordered  Outcome: Progressing  Goal: Absence of urinary retention  Description: INTERVENTIONS:- Assess patient’s ability to void and empty bladder- Monitor I/O- Bladder scan as needed- Discuss with physician/AP medications to alleviate retention as needed- Discuss catheterization for long term situations as appropriate  Outcome: Progressing  Goal: Urinary catheter remains patent  Description: INTERVENTIONS:- Assess patency of urinary catheter- If patient has a chronic ramírez, consider changing catheter if non-functioning- Follow guidelines for intermittent irrigation of  non-functioning urinary catheter  Outcome: Progressing     Problem: METABOLIC, FLUID AND ELECTROLYTES - ADULT  Goal: Electrolytes maintained within normal limits  Description: INTERVENTIONS:- Monitor labs and assess patient for signs and symptoms of electrolyte imbalances- Administer electrolyte replacement as ordered- Monitor response to electrolyte replacements, including repeat lab results as appropriate- Instruct patient on fluid and nutrition as appropriate  Outcome: Progressing  Goal: Fluid balance maintained  Description: INTERVENTIONS:- Monitor labs - Monitor I/O and WT- Instruct patient on fluid and nutrition as appropriate- Assess for signs & symptoms of volume excess or deficit  Outcome: Progressing  Goal: Glucose maintained within target range  Description: INTERVENTIONS:- Monitor Blood Glucose as ordered- Assess for signs and symptoms of hyperglycemia and hypoglycemia- Administer ordered medications to maintain glucose within target range- Assess nutritional intake and initiate nutrition service referral as needed  Outcome: Progressing

## 2025-04-09 NOTE — ASSESSMENT & PLAN NOTE
Palliative Diagnosis:  Acute heart failure    Goals:   See Goals of care  Palliative will follow for ongoing goals of care discussions as situation evolves.    Social Support:  Patient's support system: sister, May  Supportive listening provided  Normalized experience of patient    Care Coordination  Case discussed with critical care, case management, hospice laision    Follow-up  We appreciate the opportunity to participate in this patient's care.   We will continue to follow while admitted.    Please do not hesitate to contact our on-call provider through [facility] Secure Chat or contact [phone] should there be an acute change or other symptom control concerns.

## 2025-04-09 NOTE — ASSESSMENT & PLAN NOTE
Goals of care conversations initated yesterday with patient and patient's sister, May with critical care.  Patient and sister express not wanting to pursue aggressive interventions.  Today, discussed GOC with patient and sister, May.  Discussed option of continued disease directed cares vs hospice cares.  Patient and sister express wanting the patient to be at home and focusing on her comfort going forward.   Introduced home hospice to patient and sister May.

## 2025-04-09 NOTE — PLAN OF CARE
Problem: PHYSICAL THERAPY ADULT  Goal: Performs mobility at highest level of function for planned discharge setting.  See evaluation for individualized goals.  Description: Treatment/Interventions: Functional transfer training, LE strengthening/ROM, Therapeutic exercise, Endurance training, Patient/family training, Equipment eval/education, Bed mobility, Gait training, Spoke to nursing          See flowsheet documentation for full assessment, interventions and recommendations.  4/9/2025 1406 by Rafaela Schuster PT  Note: Prognosis: Fair  Problem List: Decreased strength, Decreased endurance, Impaired balance, Decreased mobility, Decreased cognition, Impaired hearing, Decreased skin integrity, Pain  Assessment: Pt is 89 y.o. female seen for PT evaluation on 4/9/2025 s/p admit to [facility] on 4/7/2025 w/ Hyponatremia. PT was consulted to assess pt's functional mobility and d/c needs. Order placed for PT eval and tx. PTA, pt resides with sister in 2SH with +RENETTA, ambulates with walker; presents here from [facility]. At time of eval, pt requiring mod assist x2 for bed mobility/ transfers; max assist x2 for gait trial with RW. Upon evaluation, pt presenting with impaired functional mobility d/t decreased strength, decreased endurance, impaired balance, decreased mobility, decreased cognition, impaired hearing, decreased skin integrity, and activity intolerance. Pertinent PMHx and current co-morbidities affecting pt's physical performance at time of assessment include: hyponatremia, type 2 DM, HLD, HTN, RSV, pulmonary nodule, spinal stenosis, AVB. Personal factors affecting pt at time of eval include: inability to ambulate household distances, inability to navigate level surfaces w/o external assistance, and decreased cognition. The following objective measures performed on IE also reveal limitations: Barthel Index: 15/100, Modified [place]: 4 (moderate/severe disability), and AM-PAC 6-Clicks: 6/24. Pt's clinical  presentation is currently unstable/unpredictable seen in pt's presentation of advanced age, abnormal lab value(s), ongoing medical assessment, and on telemetry monitoring. Overall, pt's rehab potential and prognosis to return to PLOF is fair as impacted by objective findings, warranting pt to receive further skilled PT interventions to address identified impairments, activity limitation(s), and participation restriction(s). Pt to benefit from continued PT tx to address deficits as defined above and maximize level of functional independent mobility. From PT/mobility standpoint, recommend level 2, moderate resource intensity in order to facilitate return to PLOF.  Barriers to Discharge: Decreased caregiver support, Inaccessible home environment     Rehab Resource Intensity Level, PT: II (Moderate Resource Intensity)    See flowsheet documentation for full assessment.     4/9/2025 1406 by Rafaela Schuster PT  Note: Prognosis: Fair  Problem List: Decreased strength, Decreased endurance, Impaired balance, Decreased mobility, Decreased cognition, Impaired hearing, Decreased skin integrity, Pain  Assessment: Pt is 89 y.o. female seen for PT evaluation on 4/9/2025 s/p admit to [facility] on 4/7/2025 w/ Hyponatremia. PT was consulted to assess pt's functional mobility and d/c needs. Order placed for PT eval and tx. PTA, pt resides with sister in 2SH with +RENETTA, ambulates with walker; presents here from [facility]. At time of eval, pt requiring mod assist x2 for bed mobility/ transfers; max assist x2 for gait trial with RW. Upon evaluation, pt presenting with impaired functional mobility d/t decreased strength, decreased endurance, impaired balance, decreased mobility, decreased cognition, impaired hearing, decreased skin integrity, and activity intolerance. Pertinent PMHx and current co-morbidities affecting pt's physical performance at time of assessment include: hyponatremia, type 2 DM, HLD, HTN, RSV, pulmonary nodule, spinal  stenosis, AVB. Personal factors affecting pt at time of eval include: inability to ambulate household distances, inability to navigate level surfaces w/o external assistance, and decreased cognition. The following objective measures performed on IE also reveal limitations: Barthel Index: 15/100, Modified [place]: 4 (moderate/severe disability), and AM-PAC 6-Clicks: 6/24. Pt's clinical presentation is currently unstable/unpredictable seen in pt's presentation of advanced age, abnormal lab value(s), ongoing medical assessment, and on telemetry monitoring. Overall, pt's rehab potential and prognosis to return to PLOF is fair as impacted by objective findings, warranting pt to receive further skilled PT interventions to address identified impairments, activity limitation(s), and participation restriction(s). Pt to benefit from continued PT tx to address deficits as defined above and maximize level of functional independent mobility. From PT/mobility standpoint, recommend level 2, moderate resource intensity in order to facilitate return to PLOF.  Barriers to Discharge: Decreased caregiver support, Inaccessible home environment     Rehab Resource Intensity Level, PT: II (Moderate Resource Intensity)    See flowsheet documentation for full assessment.

## 2025-04-09 NOTE — PLAN OF CARE
Problem: Nutrition/Hydration-ADULT  Goal: Nutrient/Hydration intake appropriate for improving, restoring or maintaining nutritional needs  Description: Monitor and assess patient's nutrition/hydration status for malnutrition. Collaborate with interdisciplinary team and initiate plan and interventions as ordered.  Monitor patient's weight and dietary intake as ordered or per policy. Utilize nutrition screening tool and intervene as necessary. Determine patient's food preferences and provide high-protein, high-caloric foods as appropriate. INTERVENTIONS:- Monitor oral intake, urinary output, labs, and treatment plans- Assess nutrition and hydration status and recommend course of action- Evaluate amount of meals eaten- Assist patient with eating if necessary - Allow adequate time for meals- Recommend/ encourage appropriate diets, oral nutritional supplements, and vitamin/mineral supplements- Order, calculate, and assess calorie counts as needed- Recommend, monitor, and adjust tube feedings and TPN/PPN based on assessed needs- Assess need for intravenous fluids- Provide specific nutrition/hydration education as appropriate- Include patient/family/caregiver in decisions related to nutrition  Monitor and assess patient's nutrition/hydration status for malnutrition. Collaborate with interdisciplinary team and initiate plan and interventions as ordered.  Monitor patient's weight and dietary intake as ordered or per policy. Utilize nutrition screening tool and intervene as necessary. Determine patient's food preferences and provide high-protein, high-caloric foods as appropriate. INTERVENTIONS:- Monitor oral intake, urinary output, labs, and treatment plans- Assess nutrition and hydration status and recommend course of action- Evaluate amount of meals eaten- Assist patient with eating if necessary - Allow adequate time for meals- Recommend/ encourage appropriate diets, oral nutritional supplements, and vitamin/mineral  supplements- Order, calculate, and assess calorie counts as needed- Recommend, monitor, and adjust tube feedings and TPN/PPN based on assessed needs- Assess need for intravenous fluids- Provide specific nutrition/hydration education as appropriate- Include patient/family/caregiver in decisions related to nutrition  Outcome: Progressing

## 2025-04-09 NOTE — ASSESSMENT & PLAN NOTE
HR is well-controlled without AV emil blockers.  [id number] at least 6, on Eliquis 2.5 mg bid as outpatient (unclear indication for low dose, possibly due to advanced age with multiple comorbidities).

## 2025-04-09 NOTE — CONSULTS
Consultation - Palliative Care   Name: Genia Call 89 y.o. female I MRN: [id number]  Unit/Bed#: ICU 12 I Date of Admission: 4/7/2025   Date of Service: 4/9/2025 I Hospital Day: 2   Inpatient consult to Palliative Care  Consult performed by: NELY Ramirez  Consult ordered by: Cindy Johnston DO        Physician Requesting Evaluation: Cindy Johnston DO   Reason for Evaluation / Principal Problem: Goals of care     Assessment & Plan  Hyponatremia    Stage 3a chronic kidney disease (HCC)  Lab Results   Component Value Date    EGFR 32 04/09/2025    EGFR 38 04/09/2025    EGFR 42 04/08/2025    CREATININE 1.37 (H) 04/09/2025    CREATININE 1.20 04/09/2025    CREATININE 1.11 04/08/2025     Atrial flutter    RSV (respiratory syncytial virus infection)    Acute respiratory failure with hypoxia (HCC)  Likely secondary heart failure and RSV.  Sacral decubitus ulcer    Acute HFrEF  Wt Readings from Last 3 Encounters:   04/09/25 81.2 kg (179 lb 0.2 oz)   12/05/23 78.9 kg (174 lb)   08/01/23 79 kg (174 lb 3.2 oz)   ECHO EF 27%, new reduction in EF.          New onset CM with EF 27%  In August EF was 60-65%, now 27%.  Patient with Atrial flutter contributing to poor cardiac output.  Palliative care patient  Palliative Diagnosis:  Acute heart failure    Goals:   See Goals of care  Palliative will follow for ongoing goals of care discussions as situation evolves.    Social Support:  Patient's support system: sister, May  Supportive listening provided  Normalized experience of patient    Care Coordination  Case discussed with critical care, case management, hospice laision    Follow-up  We appreciate the opportunity to participate in this patient's care.   We will continue to follow while admitted.    Please do not hesitate to contact our on-call provider through [facility] Secure Chat or contact [phone] should there be an acute change or other symptom control concerns.      Goals of care, counseling/discussion  Goals of care  conversations initated yesterday with patient and patient's sister, May with critical care.  Patient and sister express not wanting to pursue aggressive interventions.  Today, discussed GOC with patient and sister, May.  Discussed option of continued disease directed cares vs hospice cares.  Patient and sister express wanting the patient to be at home and focusing on her comfort going forward.   Introduced home hospice to patient and sister May.    Palliative Care service will follow.        PDMP Review: I have reviewed the patient's controlled substance dispensing history in the Prescription Drug Monitoring Program in compliance with the [employer] regulations before prescribing any controlled substances.    History of Present Illness   HPI: Genia Call is a 89 y.o. year old female who presents with acute respiratory failure in setting of RSV and heart failure.  PSC consulted for goals of care.      Patient with acute decline in new onset HFrEF.  Patient with recent ED evaluation for lower extremity edema and volume overload.      Review of Systems   Constitutional:  Positive for fatigue.   Respiratory:  Positive for shortness of breath.    Cardiovascular:  Positive for leg swelling.     Medical History Review: I have reviewed the patient's PMH, PSH, Social History, Family History, Meds, and Allergies     Objective :  Temp:  [98.1 °F (36.7 °C)-99.3 °F (37.4 °C)] 98.1 °F (36.7 °C)  HR:  [] 84  BP: ()/(45-85) 102/59  Resp:  [12-35] 20  SpO2:  [92 %-99 %] 93 %  O2 Device: None (Room air)  Nasal Cannula O2 Flow Rate (L/min):  [2 L/min] 2 L/min    Physical Exam  Constitutional:       Interventions: Nasal cannula in place.   HENT:      Head: Normocephalic and atraumatic.   Eyes:      General: Lids are normal.   Cardiovascular:      Pulses: Decreased pulses.   Neurological:      General: No focal deficit present.      Mental Status: She is alert and oriented to person, place, and time.   Psychiatric:          Attention and Perception: Attention and perception normal.         Behavior: Behavior is cooperative.      Comments: [facility]            Lab Results: I have reviewed the following results:  Lab Results   Component Value Date/Time    SODIUM 125 (L) 04/09/2025 06:08 AM    SODIUM 133 (L) 03/11/2025 12:59 PM    K 4.7 04/09/2025 06:08 AM    K 4.3 03/11/2025 12:59 PM    BUN 39 (H) 04/09/2025 06:08 AM    BUN 19 03/11/2025 12:59 PM    CREATININE 1.37 (H) 04/09/2025 06:08 AM    CREATININE 0.75 03/11/2025 12:59 PM    GLUC 95 04/09/2025 06:08 AM    GLUC 191 (H) 03/11/2025 12:59 PM    CALCIUM 8.4 04/09/2025 06:08 AM    CALCIUM 9.8 03/11/2025 12:59 PM    AST 37 04/08/2025 04:35 AM    AST 28 03/11/2025 12:59 PM    ALT 54 (H) 04/08/2025 04:35 AM    ALT 21 03/11/2025 12:59 PM    ALB 3.0 (L) 04/08/2025 04:35 AM    ALB 3.8 03/11/2025 12:59 PM    TP 5.8 (L) 04/08/2025 04:35 AM    TP 6.4 03/11/2025 12:59 PM    EGFR 32 04/09/2025 06:08 AM    EGFR 73 03/11/2025 12:59 PM    EGFR 37 (L) 10/20/2020 08:49 AM     Lab Results   Component Value Date/Time    HGB 11.6 04/09/2025 05:15 AM    HGB 12.7 12/14/2022 01:59 AM    WBC 11.89 (H) 04/09/2025 05:15 AM     04/09/2025 05:15 AM    INR 1.60 (H) 04/07/2025 12:28 PM    INR 1.3 03/08/2025 02:56 PM    PTT 38 (H) 04/07/2025 12:28 PM     Lab Results   Component Value Date/Time    AZO4JBHOMPQU 9.035 (H) 04/07/2025 04:36 PM       Imaging Results Review: I reviewed radiology reports from this admission including: Echocardiogram.  Other Study Results Review: EKG was reviewed.     Code Status: Level 3 - DNAR and DNI  Advance Directive and Living Will:      Power of :    POLST:      Administrative Statements   I have spent a total time of 60+  minutes in caring for this patient on the day of the visit/encounter including Diagnostic results, Prognosis, Risks and benefits of tx options, Instructions for management, Documenting in the medical record, Reviewing/placing orders in the medical record  (including tests, medications, and/or procedures), Obtaining or reviewing history  , and Communicating with other healthcare professionals .

## 2025-04-09 NOTE — ASSESSMENT & PLAN NOTE
Elevated rates on presentation-likely 2/2 volume overload, now improved   Received 1 dose cardizem in ED  Continue home atenolol  Consider PRN IV metoprolol  Continue Eliquis   Detail Level: Detailed Hide Additional Notes?: No

## 2025-04-09 NOTE — ASSESSMENT & PLAN NOTE
UROLOGY follow up - male         UROLOGY CHIEF COMPLAINT   Chief Complaint   Patient presents with   • Follow-up       UROLOGY HISTORY OF PRESENT ILLNESS    Mr. Sagar Sky is a 71 year old year old male who presents with prostate cancer.    This gentleman had robotic prostatectomy November 2016 for [place] 4+3 disease with a tertiary component of Magdy 5 disease 20% of the gland involved. He had tumor at the surgical margin and 4 lymph nodes negative for tumor. He presents today as a follow-up.    The patient denies associated dysuria, frequency, urgency, nocturia, hematuria, decreased flow of stream, hesitancy, incontinence, post void dribbling, pyuria, split stream, back/flank pain, abdominal pain. The patient denies fever, chills, night sweats, fatigue, flu-like symptoms, general malaise, decreased appetite, arthralgias, myalgias, dizziness, weight loss, dry mouth, edema, syncope, swollen glands.     PAST MEDICAL HISTORY      Shingles                                                        Comment: in butt crack    High cholesterol                                                Comment: pt reports he has high cholesterol    Adenocarcinoma of prostate (CMS/[facility])            08/2015         Comment: per biopsy    Wears glasses                                                   Comment: for driving    Gastroesophageal reflux disease                                 Comment: Intermittent    Prostatitis                                                     Comment: Episodic    Insomnia                                        11/19/2012    Acute prostatitis                               02/27/2013    Gastritis                                       01/08/2014    Rectal pain                                     08/28/2014    Chest pain                                      11/27/2014      Comment: with palpitations    Elevated PSA                                    08/05/2015    Incontinence                                     Wt Readings from Last 3 Encounters:   04/09/25 81.2 kg (179 lb 0.2 oz)   12/05/23 78.9 kg (174 lb)   08/01/23 79 kg (174 lb 3.2 oz)   ECHO EF 27%, new reduction in EF.           08/05/2015    Prostate cancer (CMS/HCC)                       01/04/2016    Palpitations                                    11/17/2016    Obstructed Weeks catheter (CMS/[facility])             11/19/2016    Fever of unknown origin                         01/03/2017    Incisional hernia                               06/19/2018    PAST SURGICAL HISTORY      REMOVAL GALLBLADDER                             09/07/2011      Comment: Lap Sara    CARDIOVASCULAR STRESS TEST                      11/27/2014      Comment: No ischemia    ECHO M-MODE/2D/DOPPLER (ROUTINE)                12/31/2014      Comment: LVEF - 63% - done for c/o palpitations    INGUINAL HERNIA REPAIR                          ~@age 30      COLONOSCOPY                                     ~2011         PROSTATECTOMY                                   11/15/2016      Comment: Robotic    VENTRAL HERNIA REPAIR                           07/31/2018      Comment: robotic    SOCIAL HISTORY  Social History     Tobacco Use   • Smoking status: Never Smoker   • Smokeless tobacco: Never Used   Substance Use Topics   • Alcohol use: Yes     Alcohol/week: 0.0 - 0.6 oz     Comment: occasional     I reviewed the social history.  All identified relevant social history is included in the HPI and/or assessment/plan.    FAMILY HISTORY  Family History   Problem Relation Age of Onset   • Osteoporosis Mother    • Stroke Mother    • Hypertension Mother    • Osteoporosis Sister    • Patient is unaware of any medical problems Brother    • Osteoporosis Sister      I reviewed the family history.  All identified relevant family history is included in the HPI and/or assessment/plan.  No identified history of urinary symptoms or congenital abnormalities have been identified.  There is no history of prostate cancer.    MEDICATIONS    Current Outpatient Medications   Medication Sig   • LORazepam (ATIVAN) 0.5 MG tablet Take 0.5 mg by mouth as needed for Anxiety.   • PROPRANOLOL HCL PO Take 120 mg by  mouth daily as needed. As needed for heart palpatations   • Multiple Vitamins-Minerals (MULTIVITAMIN ADULT PO) Take by mouth daily.   • esomeprazole (NEXIUM) 20 MG capsule Take 20 mg by mouth daily as needed.      No current facility-administered medications for this visit.        ALLERGIES    ALLERGIES:  No Known Allergies    Review of Systems    Obtained by patient intake form and entered by the medical assistant. (see MA notes). I have reviewed the pertinent positives and negatives with the patient and agree with documentation entered.     PHYSICIAL EXAM    Vital Signs: Blood pressure 138/78, pulse 78, height 5' 6\" (1.676 m).   General: The patient is well developed, well nourished, in no acute distress, appears stated age.   Neurologic: Alert. Normal mood and affect.   Skin: Warm and dry.   Neck: Symmetric without swelling or tenderness.   Respiratory: Respiratory effort normal.   Cardiovascular: Regular rate and rhythm  Lymphatics: There is no neck or groin adenopathy.   Back: No costovertebral angle tenderness.   Abdomen: Bladder and kidneys are nonpalpable. There are no inguinal or ventral hernias present. There is no hepatosplenomegaly. There are no other abdominal masses present. Stool specimen not indicated.  Extremities: No swelling or tenderness.     Preop:   Genitourinary:  There is no evidence of a fissure. Scrotum without lesions. Epididymides descended bilaterally without mass or tenderness. Testicles descended bilaterally symmetric without masses or tenderness. Urethral meatus normal size and position. Phallus without skin lesions or Peyronie's plaque.   Digital Rectal Exam:  Anus and perineum normal.Prostate smooth and symmetric. Seminal vesicles nonpalpable. Sphincter tone normal. 2 + smooth, no nodules    DATA REVIEWED  Lab Results   Component Value Date    CREATININE 1.10 07/02/2018     PSA, Total (ng/mL)   Date Value   03/07/2019 0.05   10/30/2018 0.04   06/04/2018 0.01   12/04/2017 <0.01    08/09/2017 <0.01   05/11/2017 <0.01   02/08/2017 <0.01        PATHOLOGY  Pathology Report         Client: [facility]         Submitting Physician: Herson Lima MD         Additional Physician(s): Derick Meneses MD         Date Specimen Collected: 11/15/16           Accession #:  [id number]     Date Specimen Received:  11/15/16           Requisition     #:[id number]     Date Reported:           11/17/2016 09:17   Location:     [facility]         ______________________________________________________________________________     Pathologic Diagnosis :     <<<<<     A. Prostate, radical prostatectomy:     - Prostatic adenocarcinoma, [place] grade 4+3 with minor tertiary pattern 5,     forming a multifocal mass involving left and right prostate and accounting for     approximately 20% of gland volume.     -  No extraprostatic extension identified.  Bilateral seminal vesicles are     negative for malignancy.     -  Tumor focally involves posterior surgical resection margin.     -  AJCC stage: pT2c pN0 (see synoptic report).         B. Lymph nodes, pelvic, lymph node dissection:     - Four benign lymph nodes; negative for metastatic carcinoma (0/4).     >>>>>         Diagnosis Comment:          PROSTATE GLAND: Radical Prostatectomy         Specimen          Procedure:    Radical prostatectomy          Prostate Size: 5.3 x 4.0 x 3.7 cm          Prostate Weight: 61.5 g          Lymph Node Sampling:   Pelvic lymph node dissection         Tumor          Histologic Type:   Adenocarcinoma (acinar, not otherwise specified)          Magdy Pattern:                 Primary Pattern:   Grade 4               Secondary Pattern:   Grade 3               Tertiary Pattern:   Grade 5               Total Magdy Score: 7         Extent          Tumor Quantitation               Proportion (p%) of prostatic tissue involved by tumor: 20%          Extraprostatic Extension:   Not identified           Seminal Vesicle Invasion:   Not identified         Margins:   Margin(s) involved by invasive carcinoma: Posterior          Focality of Tumor:    Unifocal         Accessory Findings          Treatment Effect on Carcinoma:   Not identified          Lymph-Vascular Invasion:   Not identified          Perineural Invasion:   Present         Pathologic Staging (pTNM)          Primary Tumor (pT):    pT2c:  Bilateral disease          Regional Lymph Nodes (pN):   pN0: No regional lymph node metastasis               Number of Lymph Nodes Examined: 4               Number of Lymph Nodes Involved:   None identified          Distant Metastasis (pM):    Not applicable         Ancillary Studies: None     Additional Pathologic Findings:   High-grade prostatic intraepithelial     neoplasia (PIN)     Comments: None             Arthur Evans MD     ** Electronic Signature (DMV) 11/17/2016 09:17 **     AssessmenT/PLAN  This gentleman has done well after prostatectomy. His PSA is detectable at a low number. His urine control is good He has a little leakage with intercourse (not every time).  He has good erections.  This is the desired result.     His PSA has increased a little.  I will have him see radiation oncology.      I will see him in 4 months with a PSA.     I spent 15 minutes with the patient today. Greater than half this time was spent in consultation and coordination of care.    Herson Lima MD  [facility]  Pager [phone]  Office [phone]

## 2025-04-09 NOTE — PROGRESS NOTES
Patient:    MRN:  [id number]    Aidin Request ID:  [id number]    Level of care reserved:  Hospice    Partner Reserved:  [facility], [place] [address] [phone]    Clinical needs requested:    Geography searched:  [phone]    Start of Service:    Request sent:  1:07pm EDT on 4/9/2025 by Vanda Cee    Partner reserved:  3:47pm EDT on 4/9/2025 by Joselin Ayala    Choice list shared:  3:47pm EDT on 4/9/2025 by Joselin Ayala

## 2025-04-09 NOTE — PROGRESS NOTES
Progress Note - Critical Care/ICU   Name: Genia Call 89 y.o. female I MRN: [id number]  Unit/Bed#: ICU 12 I Date of Admission: 4/7/2025   Date of Service: 4/9/2025 I Hospital Day: 2      Assessment & Plan  Hyponatremia  Appears to be 2/2 hypervolemia-pt with elevated BNP, vascular congestion on CXR, 4+ pitting LE edema  Echocardiogram with EF of 27%   Family will likely transition to comfort measures today   Attempted lasix bolusing with sub-optimal result   Attempted two doses of IV bumex, also with suboptimal results   ramírez catheter for strict I/O  Check urine lytes, serum and urine osmo  Q 4h Na checks  Sodium slowly improving overnight, now 125   Goal would be 126-128 by 4/8 1200    Type 2 diabetes mellitus with stage 2 chronic kidney disease, without long-term current use of insulin  (HCC)  Lab Results   Component Value Date    HGBA1C 8.4 (H) 04/08/2025     Check A1c  Hold home metformin and Trulicity  Accuchecks and SSI      Mixed hyperlipidemia  Continue statin and zetia  Hypertension  Continue home BB and ARB  Primary hypothyroidism  Check TSH  Continue Synthroid  Stage 3a chronic kidney disease (HCC)  Lab Results   Component Value Date    EGFR 38 04/09/2025    EGFR 42 04/08/2025    EGFR 44 04/08/2025    CREATININE 1.20 04/09/2025    CREATININE 1.11 04/08/2025    CREATININE 1.06 04/08/2025   Creatine 0.94, GFR 51  Appears to be at baseline  Strict I/O  Trend renal indices  Avoid nephrotoxins  Paroxysmal atrial fibrillation (HCC)  Elevated rates on presentation-likely 2/2 volume overload, now improved   Received 1 dose cardizem in ED  Continue home atenolol  Consider PRN IV metoprolol  Continue Eliquis  RSV (respiratory syncytial virus infection)  Pt recently tested positive for RSV at her rehab facility  Tested positive again today in ED  Supportive care  Acute respiratory failure with hypoxia (HCC)  Pt presented to ED with SOB, had been started on oxygen at the nursing home when she tested positive for  RSV  Vascular congestion and pleural effusions noted on imaging, no PE  On 2L NC  Given lasix in ED    Plan:  Will check procal, hold on abx for now  Monitor I/O  echo from 8/2024 showed EF 61-65% with pericardial effusion, repeat with EF 27%  Attempted diuresis with suboptimal results  Wean oxygen as able for sat>90%  Will likely transition to comfort measures today   Sacral decubitus ulcer  POA  Consult wound care  Volume overload  Pt presents with volume overload  Continue diuresis with close monitoring of Na, as patient was hyponatremic with Na 118 in ED    Pulmonary nodule  Right upper lobe nodule seen on CT scan  Can consider optional CT follow up at 12 months if patient wishes to pursue  Spinal stenosis at L4-L5 level  Severe spinal stenosis at L4-L5 seen on imaging with concern for mass effect on cauda equina nerve roots  Pt without complaints of back pain, complications concerning for cauda equina syndrome  If pain or concern for cauda equina could consider MRI evaluation  Disposition: Critical care    ICU Core Measures     A: Assess, Prevent, and Manage Pain Has pain been assessed? Yes  Need for changes to pain regimen? No   B: Both SAT/SAT  N/A   C: Choice of Sedation RASS Goal: 0 Alert and Calm  Need for changes to sedation or analgesia regimen? No   D: Delirium CAM-ICU: Unable to perform secondary to Acute cognitive dysfunction   E: Early Mobility  Plan for early mobility? Yes   F: Family Engagement Plan for family engagement today? Yes       Review of Invasive Devices:    [place] Plan: end of life care        Prophylaxis:  VTE VTE covered by:  apixaban, Oral, 2.5 mg at 04/08/25 0915       Stress Ulcer  not ordered         24 Hour Events : attempted further diuresis with suboptimal results.  Repeat echo with EF 27%.  Goals of care discussions with patient's family yielding no escalation of care.  Mental status significantly improved last evening when all family was present at the bedside.  Resting  comfortably overnight.  Sodium level increasing.  Family will re-engage with comfort care decision in the AM   Subjective   Review of Systems: See HPI for Review of Systems    Objective :                   Vitals I/O      Most Recent Min/Max in 24hrs   Temp 98.6 °F (37 °C) Temp  Min: 98.4 °F (36.9 °C)  Max: 99.5 °F (37.5 °C)   Pulse 68 Pulse  Min: 68  Max: 130   Resp 12 Resp  Min: 12  Max: 36   /66 BP  Min: 75/50  Max: 139/81   O2 Sat 98 % SpO2  Min: 96 %  Max: 99 %      Intake/Output Summary (Last 24 hours) at 4/9/2025 0121  Last data filed at 4/8/2025 2200  Gross per 24 hour   Intake 348.16 ml   Output 235 ml   Net 113.16 ml       Diet Melecio/CHO Controlled; Consistent Carbohydrate Diet Level 3 (6 carb servings/90 grams CHO/meal)    Invasive Monitoring           Physical Exam   Physical Exam  Eyes:      General: Lids are normal.      Extraocular Movements: Extraocular movements intact.      Conjunctiva/sclera: Conjunctivae normal.   Skin:     General: Skin is warm and dry.   HENT:      Head: Normocephalic and atraumatic.   Neck:      Trachea: Trachea normal.   Cardiovascular:      Rate and Rhythm: Normal rate.      Pulses: Normal pulses.   Musculoskeletal:      Cervical back: Normal range of motion.      Right lower leg: 3+ Edema present.      Left lower leg: 3+ Edema present.   Abdominal:      Palpations: Abdomen is soft.      Tenderness: There is no abdominal tenderness.   Constitutional:       Appearance: She is ill-appearing.   Pulmonary:      Breath sounds: Decreased breath sounds present.   Neurological:      General: No focal deficit present.      Mental Status: She is easily aroused. She is lethargic.      GCS: GCS eye subscore is 3. GCS verbal subscore is 5. GCS motor subscore is 6.      Sensory: Sensation is intact.          Diagnostic Studies        Lab Results: I have reviewed the following results:     Medications:  Scheduled PRN   apixaban, 2.5 mg, BID  aspirin, 81 mg, Daily  [Held by provider]  atenolol, 25 mg, Daily  atorvastatin, 80 mg, Daily  chlorhexidine, 15 mL, Q12H NOEMI  fenofibrate, 145 mg, Daily  insulin lispro, 1-5 Units, Q6H NOEMI  levothyroxine, 150 mcg, Early Morning  [Held by provider] losartan, 50 mg, Daily          Continuous    norepinephrine, 1-5 mcg/min, Last Rate: 4 mcg/min (04/09/25 0041)         Labs:   CBC    Recent Labs     04/07/25  1228 04/08/25  0435   WBC 13.51* 11.63*   HGB 12.4 11.9   HCT 35.7 33.7*    326     BMP    Recent Labs     04/08/25  1816 04/09/25  0008   SODIUM 124* 125*   K 4.8 4.6   CL 89* 91*   CO2 28 26   AGAP 7 8   BUN 36* 34*   CREATININE 1.11 1.20   CALCIUM 8.6 7.3*       Coags    Recent Labs     04/07/25  1228   INR 1.60*   PTT 38*        Additional Electrolytes  Recent Labs     04/07/25  1228 04/08/25  0435   MG 1.8* 1.8*   PHOS  --  3.9   CAIONIZED  --  1.12          Blood Gas    No recent results  No recent results LFTs  Recent Labs     04/07/25  1228 04/08/25  0435   ALT 39 54*   AST 24 37   ALKPHOS 100 134*   ALB 3.2* 3.0*   TBILI 1.20* 1.08*       Infectious  Recent Labs     04/07/25  1636 04/08/25  0435   PROCALCITONI 0.30* 0.32*     Glucose  Recent Labs     04/08/25  0823 04/08/25  1247 04/08/25  1816 04/09/25  0008   GLUC 146* 153* 90 80

## 2025-04-09 NOTE — ASSESSMENT & PLAN NOTE
Lab Results   Component Value Date    EGFR 32 04/09/2025    EGFR 38 04/09/2025    EGFR 42 04/08/2025    CREATININE 1.37 (H) 04/09/2025    CREATININE 1.20 04/09/2025    CREATININE 1.11 04/08/2025

## 2025-04-09 NOTE — OCCUPATIONAL THERAPY NOTE
Occupational Therapy Evaluation        Patient Name: Genia Call  Today's Date: 4/9/2025 04/09/25 0957   OT Last Visit   OT Visit Date 04/09/25   Note Type   Note type Evaluation   Pain Assessment   Pain Assessment Tool Koehler-Baker FACES   Koehler-Baker FACES Pain Rating 4   Pain Location/Orientation Location: Leg   Hospital Pain Intervention(s) Repositioned;Ambulation/increased activity   Restrictions/Precautions   Weight Bearing Precautions Per Order No   Other Precautions Contact/isolation;Cognitive;Chair Alarm;Bed Alarm;Multiple lines;Telemetry;Fall Risk;Hard of hearing   Home Living   Type of Home House  (Bi-level)   Home Layout Two level;Stairs to enter with rails;Bed/bath upstairs;Performs ADLs on one level  (6 RENETTA to living area / level of bed/lbathroom)   Bathroom Shower/Tub Walk-in shower   Bathroom Equipment Grab bars in shower;Shower chair;Commode;Hand-held shower   Bathroom Accessibility Accessible;Accessible via wheelchair   Home Equipment Walker   Additional Comments pt reports ambulating with RW at baseline.   Prior Function   Level of [place] Needs assistance with ADLs;Independent with functional mobility;Needs assistance with IADLS   Lives With Family  (sister)   Receives Help From Family   IADLs Family/Friend/Other provides transportation;Family/Friend/Other provides meals;Family/Friend/Other provides medication management   Falls in the last 6 months 1 to 4  (3 falls)   Comments Home s/u information above obtained from CM note as pt questionable historian.   Lifestyle   Autonomy Pt reports residing in 2SH with sister and 6 RENETTA. Pt requires assistance for ADLs and IADLs. Pt reports ambulating with RW at baseline.   Reciprocal Relationships Supportive Family   Intrinsic Gratification Pt reports she enjoys reading books and crossword puzzles.   General   Family/Caregiver Present No   ADL   Where Assessed Edge of bed  (& chair; ADL levels based on functional performance during  OT evaluation.)   Eating Assistance 3  Moderate Assistance   Eating Deficit Setup;Supervision/safety;Increased time to complete;Verbal cueing   Grooming Assistance 3  Moderate Assistance   Grooming Deficit Setup;Verbal cueing;Supervision/safety;Increased time to complete   UB Bathing Assistance 3  Moderate Assistance   UB Bathing Deficit Setup;Verbal cueing;Supervision/safety;Increased time to complete   LB Bathing Assistance 2  Maximal Assistance   LB Bathing Deficit Setup;Verbal cueing;Supervision/safety;Increased time to complete   UB Dressing Assistance 3  Moderate Assistance   UB Dressing Deficit Setup;Verbal cueing;Supervision/safety;Increased time to complete;Thread RUE;Thread LUE   LB Dressing Assistance 1  Total Assistance   LB Dressing Deficit Setup;Verbal cueing;Supervision/safety;Increased time to complete;Don/doff R sock;Don/doff L sock   Toileting Assistance  2  Maximal Assistance   Toileting Deficit Setup;Verbal cueing;Supervison/safety;Increased time to complete;Perineal hygiene   Functional Assistance 3  Moderate Assistance   Bed Mobility   Supine to Sit 3  Moderate assistance   Additional items Assist x 2;HOB elevated;Bedrails;Increased time required;Verbal cues   Transfers   Sit to Stand 3  Moderate assistance   Additional items Assist x 2;Armrests;Increased time required;Verbal cues   Stand to Sit 3  Moderate assistance   Additional items Assist x 2;Armrests;Increased time required;Verbal cues   Additional Comments w/ RW   Functional Mobility   Functional Mobility 2  Maximal assistance   Additional Comments MaxAx2 w/ RW, pt requiring max verbal cues and increased time during 3 steps from bed to chair.   Additional items Rolling walker   Balance   Static Sitting Fair -   Dynamic Sitting Poor +   Static Standing Poor   Dynamic Standing Poor -   Activity Tolerance   Activity Tolerance Patient limited by fatigue   Medical Staff Made Aware Pt seen as a co-eval with PT due to the patient's  co-morbidities, clinically unstable presentation, and present impairments which are a regression from the patient's baseline.  (PT Rafaela)   Nurse Made Aware SHANDRA Millan   RUE Assessment   RUE Assessment   (3-/5 grossly assessed during functional mobility)   LUE Assessment   LUE Assessment   (3-/5 grossly assessed during functional mobility)   Hand Function   Gross Motor Coordination Functional   Fine Motor Coordination Functional   Cognition   Overall Cognitive Status Impaired   Arousal/Participation Alert;Responsive;Cooperative   Attention Attends with cues to redirect   Orientation Level Oriented to person;Oriented to place;Oriented to situation;Disoriented to time  (oriented to month, not day or year)   Memory Decreased short term memory;Decreased recall of recent events;Decreased recall of precautions   Following Commands Follows one step commands with increased time or repetition   Assessment   Limitation Decreased ADL status;Decreased UE ROM;Decreased UE strength;Decreased Safe judgement during ADL;Decreased cognition;Decreased endurance;Decreased self-care trans;Decreased high-level ADLs   Prognosis Good   Assessment Patient is a 89 y.o. female seen for OT evaluation s/p admit to [facility] on 4/7/2025 w/Hyponatremia. PMHx and co-morbidities affecting patient's functional performance at time of assessment include: hyponatremia, type 2 DM, HLD, HTN, RSV, pulmonary nodule, spinal stenosis, AVB. Orders placed for OT evaluation and treatment.  Performed at least two patient identifiers during session including name and wristband. Pt reports residing in 2SH with sister and 6 RENETTA. Pt requires assistance for ADLs and IADLs. Pt reports ambulating with RW at baseline. Personal factors affecting patient at time of initial evaluation include: flat affect, decreased initiation and engagement, difficulty performing ADLs, and difficulty performing IADLs. Upon evaluation, patient requires moderate assist for UB  ADLs, total assist for LB ADLs, transfers and functional ambulation require moderate and maximal assist x2, with the use of Rolling Walker.  Patient is oriented to name,, oriented to place,, oriented to situation,, and disoriented to time,. Occupational performance is affected by the following deficits: decreased muscle strength, dynamic sit/ stand balance deficit with poor standing tolerance time for self care and functional mobility, decreased activity tolerance, impaired memory, impaired problem solving, decreased safety awareness, and increased pain.  Patient to benefit from continued Occupational Therapy treatment while in the hospital to address deficits as defined above and maximize level of functional independence with ADLs and functional mobility. Occupational Performance areas to address include: eating, grooming , bathing/ shower, dressing, toilet hygiene, transfer to all surfaces, functional ambulation, and functional mobility. From OT standpoint, recommendation at time of d/c would be Level 2.   Plan   Treatment Interventions ADL retraining;Functional transfer training;UE strengthening/ROM;Endurance training;Cognitive reorientation;Equipment evaluation/education;Compensatory technique education;Activityengagement   Goal Expiration Date 04/23/25   OT Treatment Day 0   OT Frequency 3-5x/wk   Discharge Recommendation   Rehab Resource Intensity Level, OT II (Moderate Resource Intensity)   AM-PAC Daily Activity Inpatient   Lower Body Dressing 1   Bathing 1   Toileting 2   Upper Body Dressing 2   Grooming 2   Eating 2   Daily Activity Raw Score 10   Turning Head Towards Sound 3   Follow Simple Instructions 3   Low Function Daily Activity Raw Score 16   Low Function Daily Activity Standardized Score  27.65   AM-PAC Applied Cognition Inpatient   Following a Speech/Presentation 4   Understanding Ordinary Conversation 4   Taking Medications 3   Remembering Where Things Are Placed or Put Away 2   Remembering List  of 4-5 Errands 2   Taking Care of Complicated Tasks 2   Applied Cognition Raw Score 17   Applied Cognition Standardized Score 36.52   Barthel Index   Feeding 5   Bathing 0   Grooming Score 0   Dressing Score 0   Bladder Score 0   Bowels Score 0   Toilet Use Score 5   Transfers (Bed/Chair) Score 5   Mobility (Level Surface) Score 0   Stairs Score 0   Barthel Index Score 15   Modified Lancaster Scale   Modified Lancaster Scale 4   End of Consult   Patient Position at End of Consult Bedside chair;Bed/Chair alarm activated;All needs within reach   Nurse Communication Nurse aware of consult       Occupational Therapy goals: In 5-7 days:    1- Patient will participate in continued assessment of bed mobility and functional transfers in order to develop most appropriate POC.  2- Patient will verbalize and demonstrate good body mechanics and joint protection techniques during ADLs with no verbal cues.  3- Patient will increase OOB/ sitting tolerance to 2-4 hours per day for increased participation in self care and leisure tasks with no s/s of exertion.  4- Patient will improve dynamic sitting balance to good to increase safety and independence during functional transfers and ADL and decrease risk for falls.  5- Patient will complete grooming task with set up assist.   6-Patient/ Family will demonstrate competency with UE Home Exercise Program.  7- Patient will complete Interest checklist to explore participation in play/ leisure tasks for increased satisfaction and quality of life.        WILLEM Carolina, OTR/L

## 2025-04-09 NOTE — ASSESSMENT & PLAN NOTE
Pt presented to ED with SOB, had been started on oxygen at the nursing home when she tested positive for RSV  Vascular congestion and pleural effusions noted on imaging, no PE  On 2L NC  Given lasix in ED    Plan:  Will check procal, hold on abx for now  Monitor I/O  echo from 8/2024 showed EF 61-65% with pericardial effusion, repeat with EF 27%  Attempted diuresis with suboptimal results  Wean oxygen as able for sat>90%  Will likely transition to comfort measures today

## 2025-04-09 NOTE — ASSESSMENT & PLAN NOTE
In August EF was 60-65%, now 27%.  Patient with Atrial flutter contributing to poor cardiac output.

## 2025-04-09 NOTE — ASSESSMENT & PLAN NOTE
Lab Results   Component Value Date    HGBA1C 8.4 (H) 04/08/2025     Check A1c  Hold home metformin and Trulicity  Accuchecks and SSI

## 2025-04-09 NOTE — PLAN OF CARE
Problem: OCCUPATIONAL THERAPY ADULT  Goal: Performs self-care activities at highest level of function for planned discharge setting.  See evaluation for individualized goals.  Description: Treatment Interventions: ADL retraining, Functional transfer training, UE strengthening/ROM, Endurance training, Cognitive reorientation, Equipment evaluation/education, Compensatory technique education, Activityengagement          See flowsheet documentation for full assessment, interventions and recommendations.   Outcome: Progressing  Note: Limitation: Decreased ADL status, Decreased UE ROM, Decreased UE strength, Decreased Safe judgement during ADL, Decreased cognition, Decreased endurance, Decreased self-care trans, Decreased high-level ADLs  Prognosis: Good  Assessment: Patient is a 89 y.o. female seen for OT evaluation s/p admit to [facility] on 4/7/2025 w/Hyponatremia. PMHx and co-morbidities affecting patient's functional performance at time of assessment include: hyponatremia, type 2 DM, HLD, HTN, RSV, pulmonary nodule, spinal stenosis, AVB. Orders placed for OT evaluation and treatment.  Performed at least two patient identifiers during session including name and wristband. Pt reports residing in [facility] with sister and 6 RENETTA. Pt requires assistance for ADLs and IADLs. Pt reports ambulating with RW at baseline. Personal factors affecting patient at time of initial evaluation include: flat affect, decreased initiation and engagement, difficulty performing ADLs, and difficulty performing IADLs. Upon evaluation, patient requires moderate assist for UB ADLs, total assist for LB ADLs, transfers and functional ambulation require moderate and maximal assist x2, with the use of Rolling Walker.  Patient is oriented to name,, oriented to place,, oriented to situation,, and disoriented to time,. Occupational performance is affected by the following deficits: decreased muscle strength, dynamic sit/ stand balance deficit  with poor standing tolerance time for self care and functional mobility, decreased activity tolerance, impaired memory, impaired problem solving, decreased safety awareness, and increased pain.  Patient to benefit from continued Occupational Therapy treatment while in the hospital to address deficits as defined above and maximize level of functional independence with ADLs and functional mobility. Occupational Performance areas to address include: eating, grooming , bathing/ shower, dressing, toilet hygiene, transfer to all surfaces, functional ambulation, and functional mobility. From OT standpoint, recommendation at time of d/c would be Level 2.     Rehab Resource Intensity Level, OT: II (Moderate Resource Intensity)

## 2025-04-09 NOTE — CASE MANAGEMENT
Case Management Discharge Planning Note    Patient name Genia Call  Location ICU 12/ICU 12 MRN [id number]  : 3/26/1929 Date 2025       Current Admission Date: 2025  Current Admission Diagnosis:Hyponatremia   Patient Active Problem List    Diagnosis Date Noted Date Diagnosed    New onset CM with EF 27% 2025     CAD s/p remote PCI 2025     AVB s/p BSC DC PPM (2024) 2025     Palliative care patient 2025     Goals of care, counseling/discussion 2025     Hyponatremia 2025     Atrial flutter 2025     RSV (respiratory syncytial virus infection) 2025     Acute respiratory failure with hypoxia (HCC) 2025     Sacral decubitus ulcer 2025     Acute HFrEF 2025     Pulmonary nodule 2025     Spinal stenosis at L4-L5 level 2025     Type 2 diabetes mellitus with stage 2 chronic kidney disease, without long-term current use of insulin  (HCC) 2022     Mixed hyperlipidemia 2022     Hypertension 2022     Primary hypothyroidism 2022     Osteoporosis with current pathological fracture 2022     Stage 3a chronic kidney disease (HCC) 2022       LOS (days): 2  Geometric Mean LOS (GMLOS) (days): 3.6  Days to GMLOS:1.6     OBJECTIVE:  Risk of Unplanned Readmission Score: 16.78         Current admission status: Inpatient   Preferred Pharmacy:   [facility]/pharmacy #[id number] - [place] - [address]  [address]  [place] [address]  Phone: [phone] Fax: [phone]    Primary Care Provider: Isidro Paz MD    Primary Insurance: [employer]  REP  Secondary Insurance:     DISCHARGE DETAILS:    Other Referral/Resources/Interventions Provided:  Interventions: Transportation  Referral Comments: Per [facility], DME is coming tomorrow around 11 AM. Hospice requested 12:00 PM transport. CM sent a referral to [facility] requesting [facility] transport for 12 pm tomorrow. Round trip to schedule and email this CM a confirmed  transport time.    Would you like to participate in our [facility] service program?  : No - Declined    Treatment Team Recommendation: Home, Hospice  Discharge Destination Plan:: Home, Hospice  Transport at Discharge : Stretcher van     Number/Name of Dispatcher: [employer]  Transported by (Company and Unit #): TBD  ETA of Transport (Date): 04/10/25  ETA of Transport (Time): 1200     Additional Comments: CM attempted to call patient's sister and left a message requesting a call back.

## 2025-04-09 NOTE — ASSESSMENT & PLAN NOTE
Lab Results   Component Value Date    EGFR 38 04/09/2025    EGFR 42 04/08/2025    EGFR 44 04/08/2025    CREATININE 1.20 04/09/2025    CREATININE 1.11 04/08/2025    CREATININE 1.06 04/08/2025   Creatine 0.94, GFR 51  Appears to be at baseline  Strict I/O  Trend renal indices  Avoid nephrotoxins

## 2025-04-09 NOTE — ASSESSMENT & PLAN NOTE
Pt presents with volume overload  Continue diuresis with close monitoring of Na, as patient was hyponatremic with Na 118 in ED
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
## 2025-04-09 NOTE — ASSESSMENT & PLAN NOTE
Appears to be 2/2 hypervolemia-pt with elevated BNP, vascular congestion on CXR, 4+ pitting LE edema  Echocardiogram with EF of 27%   Family will likely transition to comfort measures today   Attempted lasix bolusing with sub-optimal result   Attempted two doses of IV bumex, also with suboptimal results   ramírez catheter for strict I/O  Check urine lytes, serum and urine osmo  Q 4h Na checks  Sodium slowly improving overnight, now 125   Goal would be 126-128 by 4/8 1200

## 2025-04-10 ENCOUNTER — HOME CARE VISIT (OUTPATIENT)
Dept: HOME HOSPICE | Facility: HOSPICE | Age: OVER 89
End: 2025-04-10
Payer: MEDICARE

## 2025-04-10 ENCOUNTER — HOME CARE VISIT (OUTPATIENT)
Dept: HOME HEALTH SERVICES | Facility: HOME HEALTHCARE | Age: OVER 89
End: 2025-04-10
Payer: MEDICARE

## 2025-04-10 VITALS
TEMPERATURE: 97.9 F | RESPIRATION RATE: 18 BRPM | HEART RATE: 78 BPM | DIASTOLIC BLOOD PRESSURE: 68 MMHG | SYSTOLIC BLOOD PRESSURE: 118 MMHG

## 2025-04-10 VITALS
TEMPERATURE: 97.9 F | SYSTOLIC BLOOD PRESSURE: 114 MMHG | BODY MASS INDEX: 32.94 KG/M2 | RESPIRATION RATE: 24 BRPM | HEIGHT: 62 IN | HEART RATE: 86 BPM | OXYGEN SATURATION: 99 % | DIASTOLIC BLOOD PRESSURE: 67 MMHG | WEIGHT: 179.01 LBS

## 2025-04-10 PROCEDURE — G0155 HHCP-SVS OF CSW,EA 15 MIN: HCPCS

## 2025-04-10 PROCEDURE — G0299 HHS/HOSPICE OF RN EA 15 MIN: HCPCS

## 2025-04-10 PROCEDURE — 10330057 MEDICATION, GENERAL

## 2025-04-10 PROCEDURE — 99238 HOSP IP/OBS DSCHRG MGMT 30/<: CPT | Performed by: STUDENT IN AN ORGANIZED HEALTH CARE EDUCATION/TRAINING PROGRAM

## 2025-04-10 RX ORDER — LIDOCAINE 50 MG/G
2 PATCH TOPICAL DAILY
Qty: 60 PATCH | Refills: 0 | Status: SHIPPED | OUTPATIENT
Start: 2025-04-11 | End: 2025-04-10

## 2025-04-10 RX ORDER — BUMETANIDE 1 MG/1
1 TABLET ORAL 2 TIMES DAILY
Status: DISCONTINUED | OUTPATIENT
Start: 2025-04-10 | End: 2025-04-10 | Stop reason: HOSPADM

## 2025-04-10 RX ORDER — ACETAMINOPHEN 325 MG/1
650 TABLET ORAL EVERY 6 HOURS PRN
Qty: 60 TABLET | Refills: 0 | Status: SHIPPED | OUTPATIENT
Start: 2025-04-10 | End: 2025-04-11

## 2025-04-10 RX ORDER — BUMETANIDE 1 MG/1
1 TABLET ORAL 2 TIMES DAILY
Status: DISCONTINUED | OUTPATIENT
Start: 2025-04-10 | End: 2025-04-10

## 2025-04-10 RX ORDER — BUMETANIDE 1 MG/1
1 TABLET ORAL 2 TIMES DAILY
Qty: 60 TABLET | Refills: 0 | Status: SHIPPED | OUTPATIENT
Start: 2025-04-10 | End: 2025-04-11

## 2025-04-10 RX ADMIN — BUMETANIDE 1 MG: 1 TABLET ORAL at 09:42

## 2025-04-10 NOTE — CASE MANAGEMENT
Case Management Discharge Planning Note    Patient name Genia Call  Location ICU 12/ICU 12 MRN [id number]  : 3/26/1929 Date 4/10/2025       Current Admission Date: 2025  Current Admission Diagnosis:Hyponatremia   Patient Active Problem List    Diagnosis Date Noted Date Diagnosed    New onset CM with EF 27% 2025     CAD s/p remote PCI 2025     AVB s/p BSC DC PPM (2024) 2025     Palliative care patient 2025     Goals of care, counseling/discussion 2025     Comfort measures 2025     Hyponatremia 2025     Atrial flutter 2025     RSV (respiratory syncytial virus infection) 2025     Acute respiratory failure with hypoxia (HCC) 2025     Sacral decubitus ulcer 2025     Acute HFrEF 2025     Pulmonary nodule 2025     Spinal stenosis at L4-L5 level 2025     Type 2 diabetes mellitus with stage 2 chronic kidney disease, without long-term current use of insulin  (HCC) 2022     Mixed hyperlipidemia 2022     Hypertension 2022     Primary hypothyroidism 2022     Osteoporosis with current pathological fracture 2022     Stage 3a chronic kidney disease (HCC) 2022       LOS (days): 3  Geometric Mean LOS (GMLOS) (days): 3.6  Days to GMLOS:0.7     OBJECTIVE:  Risk of Unplanned Readmission Score: 18.05         Current admission status: Inpatient   Preferred Pharmacy:   [facility]/pharmacy #[id number] - [place] - [address]  [address]  [place] [address]  Phone: [phone] Fax: [phone]    [facility] [place] - [address]  [address] [place] [address]  Phone: [phone] Fax: [phone]    Primary Care Provider: Isidro Paz MD    Primary Insurance: JUSTIN  REP  Secondary Insurance:     DISCHARGE DETAILS:                                Requested Home Health Care         Home Health Agency Name:: [facility]         Other Referral/Resources/Interventions  Provided:  Referral Comments: P/U confirmed for 1200; Grace at [facility] made aware via text.  S/W pt's sister to make her aware;  she says she is prepared for pt's arrival.         Treatment Team Recommendation: Home, Hospice  Discharge Destination Plan:: Home, Hospice  Transport at Discharge : [facility]

## 2025-04-10 NOTE — ASSESSMENT & PLAN NOTE
Patient with desire to go home today on home hospice, scheduled pickup for 11 AM  Care is to be comfort focused, rather than aggressive treatment  Patient is not requiring anxiolysis or strong pain management at this time  Emotional support provided

## 2025-04-10 NOTE — DISCHARGE SUMMARY
Discharge Summary - Critical Care/ICU   Name: Genia Call 89 y.o. female I MRN: [id number]  Unit/Bed#: ICU 12 I Date of Admission: 4/7/2025   Date of Service: 4/10/2025 I Hospital Day: 3    Admission Date: 4/7/2025 1158  Discharge Date: 04/10/25  Admitting Diagnosis: Hyponatremia [E87.1]  SOB (shortness of breath) [R06.02]  Volume overload [E87.70]  Discharge Diagnosis:   Medical Problems       Resolved Problems  Date Reviewed: 1/27/2025   None         HPI: Patient is a 89 year old female presenting on 5/7 with a complaint of shortness of breath and lower extremity edema. She has a past medical history of hyperlipidemia, chronic kidney disease stage 3, paroxysmal atrial fibrillation, hypertension, type 2 diabetes, and recent RSV infection. In the emergency room,her work-up was concerning for pulmonary edema, rapid atrial fibrillation, and hyponatremia. She was admitted to thee stepdown unit for further management.    Procedures Performed:   Orders Placed This Encounter   Procedures    ED ECG Documentation Only       Summary of Hospital Course:    An echocardiogram performed after admission demonstrated a reduction in biventricular function. A discussion was held with the patient in regards to her goals of care and the patient with her family made the decision to focus more on comfort and quality of life interventions as opposed to aggressive intervention for biventricular dusfunction and volume overload. After meeting with the palliative care and hospice teams, she made the decision to discharge to home with hospice services.    Significant Findings, Care, Treatment and Services Provided: 4/8 ECHO- left ventricular ejection fraction is 27% with severe global hypokinesis, right ventricle systolic function is moderately reduced, atria mildly dilated, mild to moderate mitral valve regurgitation, moderate tricuspid regurgitation, mild pulmonic regurgitation  4/7 CTA Chest/Abdomen/Pelvis- assessment of the segmental  and subsegmental pulmonary arteries limited due to motion artifact, small bilateral pleural effusions with bibasilar atelectasis, layering hyperdense material within the gastric fundus, complex multicystic material within the gastric fundus, age-indeterminate but acute appearing compression deformity at the superior endplate of L5, severe spinal canal stenosis at L4-L5 with mass effect on the cauda equina nerve roots, 4 mm solid pulmonary nodule in the right upper lobe, small sacral decubitus ulcer  4/7 Chest xray- cardiomegaly, pulmonary vascular congestion with small left pleural effusion    Complications: None    Condition at Discharge: stable       Discharge instructions/Information to patient and family:   See After Visit Summary (AVS) for information provided to patient and family.      Provisions for Follow-Up Care:  See after visit summary for information related to follow-up care and any pertinent home health orders.      PCP: Isidro Paz MD    Disposition: Home    Planned Readmission: No     Discharge Medications:  See after visit summary for reconciled discharge medications provided to patient and family.      Discharge Statement:  I have spent a total time of 30 minutes in caring for this patient on the day of the visit/encounter. >30 minutes of time was spent on: Diagnostic results, Prognosis, Risks and benefits of tx options, Instructions for management, Patient and family education, Counseling / Coordination of care, Documenting in the medical record, and Communicating with other healthcare professionals .

## 2025-04-10 NOTE — PLAN OF CARE
Problem: Potential for Falls  Goal: Patient will remain free of falls  Description: INTERVENTIONS:- Educate patient/family on patient safety including physical limitations- Instruct patient to call for assistance with activity - Consult OT/PT to assist with strengthening/mobility - Keep Call bell within reach- Keep bed low and locked with side rails adjusted as appropriate- Keep care items and personal belongings within reach- Initiate and maintain comfort rounds- Make Fall Risk Sign visible to staff- Offer Toileting every 2 Hours, in advance of need- Initiate/Maintain bed/chair alarm- Obtain necessary fall risk management equipment: - Apply yellow socks and bracelet for high fall risk patients- Consider moving patient to room near nurses station  INTERVENTIONS:- Educate patient/family on patient safety including physical limitations- Instruct patient to call for assistance with activity - Consult OT/PT to assist with strengthening/mobility - Keep Call bell within reach- Keep bed low and locked with side rails adjusted as appropriate- Keep care items and personal belongings within reach- Initiate and maintain comfort rounds- Make Fall Risk Sign visible to staff- Offer Toileting every 2 Hours, in advance of need- Initiate/Maintain bed/chair alarm- Obtain necessary fall risk management equipment: - Apply yellow socks and bracelet for high fall risk patients- Consider moving patient to room near nurses station  Outcome: Progressing     Problem: Prexisting or High Potential for Compromised Skin Integrity  Goal: Skin integrity is maintained or improved  Description: INTERVENTIONS:- Identify patients at risk for skin breakdown- Assess and monitor skin integrity- Assess and monitor nutrition and hydration status- Monitor labs - Assess for incontinence - Turn and reposition patient- Assist with mobility/ambulation- Relieve pressure over bony prominences- Avoid friction and shearing- Provide appropriate hygiene as needed  including keeping skin clean and dry- Evaluate need for skin moisturizer/barrier cream- Collaborate with interdisciplinary team - Patient/family teaching- Consider wound care consult   Outcome: Progressing     Problem: Nutrition/Hydration-ADULT  Goal: Nutrient/Hydration intake appropriate for improving, restoring or maintaining nutritional needs  Description: Monitor and assess patient's nutrition/hydration status for malnutrition. Collaborate with interdisciplinary team and initiate plan and interventions as ordered.  Monitor patient's weight and dietary intake as ordered or per policy. Utilize nutrition screening tool and intervene as necessary. Determine patient's food preferences and provide high-protein, high-caloric foods as appropriate. INTERVENTIONS:- Monitor oral intake, urinary output, labs, and treatment plans- Assess nutrition and hydration status and recommend course of action- Evaluate amount of meals eaten- Assist patient with eating if necessary - Allow adequate time for meals- Recommend/ encourage appropriate diets, oral nutritional supplements, and vitamin/mineral supplements- Order, calculate, and assess calorie counts as needed- Recommend, monitor, and adjust tube feedings and TPN/PPN based on assessed needs- Assess need for intravenous fluids- Provide specific nutrition/hydration education as appropriate- Include patient/family/caregiver in decisions related to nutrition  Monitor and assess patient's nutrition/hydration status for malnutrition. Collaborate with interdisciplinary team and initiate plan and interventions as ordered.  Monitor patient's weight and dietary intake as ordered or per policy. Utilize nutrition screening tool and intervene as necessary. Determine patient's food preferences and provide high-protein, high-caloric foods as appropriate. INTERVENTIONS:- Monitor oral intake, urinary output, labs, and treatment plans- Assess nutrition and hydration status and recommend course of  action- Evaluate amount of meals eaten- Assist patient with eating if necessary - Allow adequate time for meals- Recommend/ encourage appropriate diets, oral nutritional supplements, and vitamin/mineral supplements- Order, calculate, and assess calorie counts as needed- Recommend, monitor, and adjust tube feedings and TPN/PPN based on assessed needs- Assess need for intravenous fluids- Provide specific nutrition/hydration education as appropriate- Include patient/family/caregiver in decisions related to nutrition  Outcome: Progressing     Problem: PAIN - ADULT  Goal: Verbalizes/displays adequate comfort level or baseline comfort level  Description: Interventions:- Encourage patient to monitor pain and request assistance- Assess pain using appropriate pain scale- Administer analgesics based on type and severity of pain and evaluate response- Implement non-pharmacological measures as appropriate and evaluate response- Consider cultural and social influences on pain and pain management- Notify physician/advanced practitioner if interventions unsuccessful or patient reports new pain  Outcome: Progressing     Problem: INFECTION - ADULT  Goal: Absence or prevention of progression during hospitalization  Description: INTERVENTIONS:- Assess and monitor for signs and symptoms of infection- Monitor lab/diagnostic results- Monitor all insertion sites, i.e. indwelling lines, tubes, and drains- Monitor endotracheal if appropriate and nasal secretions for changes in amount and color- Sebastian appropriate cooling/warming therapies per order- Administer medications as ordered- Instruct and encourage patient and family to use good hand hygiene technique- Identify and instruct in appropriate isolation precautions for identified infection/condition  Outcome: Progressing  Goal: Absence of fever/infection during neutropenic period  Description: INTERVENTIONS:- Monitor WBC  Outcome: Progressing     Problem: SAFETY ADULT  Goal: Patient  will remain free of falls  Description: INTERVENTIONS:- Educate patient/family on patient safety including physical limitations- Instruct patient to call for assistance with activity - Consult OT/PT to assist with strengthening/mobility - Keep Call bell within reach- Keep bed low and locked with side rails adjusted as appropriate- Keep care items and personal belongings within reach- Initiate and maintain comfort rounds- Make Fall Risk Sign visible to staff- Offer Toileting every 2 Hours, in advance of need- Initiate/Maintain bed/chair alarm- Obtain necessary fall risk management equipment: - Apply yellow socks and bracelet for high fall risk patients- Consider moving patient to room near nurses station  INTERVENTIONS:- Educate patient/family on patient safety including physical limitations- Instruct patient to call for assistance with activity - Consult OT/PT to assist with strengthening/mobility - Keep Call bell within reach- Keep bed low and locked with side rails adjusted as appropriate- Keep care items and personal belongings within reach- Initiate and maintain comfort rounds- Make Fall Risk Sign visible to staff- Offer Toileting every 2 Hours, in advance of need- Initiate/Maintain bed/chair alarm- Obtain necessary fall risk management equipment: - Apply yellow socks and bracelet for high fall risk patients- Consider moving patient to room near nurses station  Outcome: Progressing  Goal: Maintain or return to baseline ADL function  Description: INTERVENTIONS:-  Assess patient's ability to carry out ADLs; assess patient's baseline for ADL function and identify physical deficits which impact ability to perform ADLs (bathing, care of mouth/teeth, toileting, grooming, dressing, etc.)- Assess/evaluate cause of self-care deficits - Assess range of motion- Assess patient's mobility; develop plan if impaired- Assess patient's need for assistive devices and provide as appropriate- Encourage maximum independence but  intervene and supervise when necessary- Involve family in performance of ADLs- Assess for home care needs following discharge - Consider OT consult to assist with ADL evaluation and planning for discharge- Provide patient education as appropriate  Outcome: Progressing  Goal: Maintains/Returns to pre admission functional level  Description: INTERVENTIONS:- Perform AM-PAC 6 Click Basic Mobility/ Daily Activity assessment daily.- Set and communicate daily mobility goal to care team and patient/family/caregiver. - Collaborate with rehabilitation services on mobility goals if consulted- Reposition patient every 2 hours.- Dangle patient 3 times a day- Stand patient 3 times a day- Ambulate patient 3 times a day- Out of bed to chair 3 times a day - Out of bed for meals 3 times a day- Out of bed for toileting- Record patient progress and toleration of activity level   Outcome: Progressing     Problem: DISCHARGE PLANNING  Goal: Discharge to home or other facility with appropriate resources  Description: INTERVENTIONS:- Identify barriers to discharge w/patient and caregiver- Arrange for needed discharge resources and transportation as appropriate- Identify discharge learning needs (meds, wound care, etc.)- Arrange for interpretive services to assist at discharge as needed- Refer to Case Management Department for coordinating discharge planning if the patient needs post-hospital services based on physician/advanced practitioner order or complex needs related to functional status, cognitive ability, or social support system  Outcome: Progressing     Problem: Knowledge Deficit  Goal: Patient/family/caregiver demonstrates understanding of disease process, treatment plan, medications, and discharge instructions  Description: Complete learning assessment and assess knowledge base.Interventions:- Provide teaching at level of understanding- Provide teaching via preferred learning methods  Outcome: Progressing     Problem: NEUROSENSORY  - ADULT  Goal: Achieves stable or improved neurological status  Description: INTERVENTIONS- Monitor and report changes in neurological status- Monitor vital signs such as temperature, blood pressure, glucose, and any other labs ordered - Initiate measures to prevent increased intracranial pressure- Monitor for seizure activity and implement precautions if appropriate    Outcome: Progressing  Goal: Remains free of injury related to seizures activity  Description: INTERVENTIONS- Maintain airway, patient safety  and administer oxygen as ordered- Monitor patient for seizure activity, document and report duration and description of seizure to physician/advanced practitioner- If seizure occurs,  ensure patient safety during seizure- Reorient patient post seizure- Seizure pads on all 4 side rails- Instruct patient/family to notify RN of any seizure activity including if an aura is experienced- Instruct patient/family to call for assistance with activity based on nursing assessment- Administer anti-seizure medications if ordered  Outcome: Progressing  Goal: Achieves maximal functionality and self care  Description: INTERVENTIONS- Monitor swallowing and airway patency with patient fatigue and changes in neurological status- Encourage and assist patient to increase activity and self care. - Encourage visually impaired, hearing impaired and aphasic patients to use assistive/communication devices  Outcome: Progressing     Problem: GENITOURINARY - ADULT  Goal: Maintains or returns to baseline urinary function  Description: INTERVENTIONS:- Assess urinary function- Encourage oral fluids to ensure adequate hydration if ordered- Administer IV fluids as ordered to ensure adequate hydration- Administer ordered medications as needed- Offer frequent toileting- Follow urinary retention protocol if ordered  Outcome: Progressing  Goal: Absence of urinary retention  Description: INTERVENTIONS:- Assess patient’s ability to void and empty  bladder- Monitor I/O- Bladder scan as needed- Discuss with physician/AP medications to alleviate retention as needed- Discuss catheterization for long term situations as appropriate  Outcome: Progressing  Goal: Urinary catheter remains patent  Description: INTERVENTIONS:- Assess patency of urinary catheter- If patient has a chronic ramírez, consider changing catheter if non-functioning- Follow guidelines for intermittent irrigation of non-functioning urinary catheter  Outcome: Progressing     Problem: METABOLIC, FLUID AND ELECTROLYTES - ADULT  Goal: Electrolytes maintained within normal limits  Description: INTERVENTIONS:- Monitor labs and assess patient for signs and symptoms of electrolyte imbalances- Administer electrolyte replacement as ordered- Monitor response to electrolyte replacements, including repeat lab results as appropriate- Instruct patient on fluid and nutrition as appropriate  Outcome: Progressing  Goal: Fluid balance maintained  Description: INTERVENTIONS:- Monitor labs - Monitor I/O and WT- Instruct patient on fluid and nutrition as appropriate- Assess for signs & symptoms of volume excess or deficit  Outcome: Progressing  Goal: Glucose maintained within target range  Description: INTERVENTIONS:- Monitor Blood Glucose as ordered- Assess for signs and symptoms of hyperglycemia and hypoglycemia- Administer ordered medications to maintain glucose within target range- Assess nutritional intake and initiate nutrition service referral as needed  Outcome: Progressing

## 2025-04-11 ENCOUNTER — HOME CARE VISIT (OUTPATIENT)
Dept: HOME HEALTH SERVICES | Facility: HOME HEALTHCARE | Age: OVER 89
End: 2025-04-11
Payer: MEDICARE

## 2025-04-11 VITALS
DIASTOLIC BLOOD PRESSURE: 80 MMHG | RESPIRATION RATE: 18 BRPM | TEMPERATURE: 97.2 F | SYSTOLIC BLOOD PRESSURE: 128 MMHG | HEART RATE: 81 BPM

## 2025-04-11 PROCEDURE — 10330064 CATHETER, FOLEY 2WAY 5CC 16FR (10/BX)

## 2025-04-11 PROCEDURE — 10330064 BAG, URINE DRN (20/CS)        BARD

## 2025-04-11 PROCEDURE — 10330064 FOAM, ADH SIL W/BORDER SACRAL 7X7" (10/"

## 2025-04-11 PROCEDURE — 10330064 CLEANSER, WND SEA-CLEANS 6OZ  COLPLT

## 2025-04-11 PROCEDURE — 10330064 FOAM, ADH SIL W/BORDER 4X4" (10/BX 20BX"

## 2025-04-11 PROCEDURE — 10330064 GLOVE, EXAM VNYL LG N/S (100/BX 10BX/CS)

## 2025-04-11 PROCEDURE — G0299 HHS/HOSPICE OF RN EA 15 MIN: HCPCS

## 2025-04-11 PROCEDURE — 10330064 DRESSING, ADAPTIC 3X3" (50/BX)"

## 2025-04-11 PROCEDURE — 10330064 UNDERPAD, REUSE 36X36 1DZ     BECKCL

## 2025-04-11 PROCEDURE — 10330064 DRESSING, TELFA STR 3X4" N/ADH (100/CT"

## 2025-04-11 PROCEDURE — 10330064 BRIEF, TAB CLSR ULTRA XLG 59-64 (15/BG 4

## 2025-04-11 PROCEDURE — 10330064 SPONGE, GAUZE 8PLY N/S 4X4" (200/PK 20P"

## 2025-04-11 PROCEDURE — 10330064 BEDPAN, PONTOON GRAPHITE 55OZ (20/CS)

## 2025-04-11 PROCEDURE — 10330064 DRESSING, CALCIUM ALGINATE AG SHEET 4X8"

## 2025-04-15 ENCOUNTER — HOME CARE VISIT (OUTPATIENT)
Dept: HOME HEALTH SERVICES | Facility: HOME HEALTHCARE | Age: OVER 89
End: 2025-04-15
Payer: MEDICARE

## 2025-04-15 ENCOUNTER — HOME CARE VISIT (OUTPATIENT)
Dept: HOME HOSPICE | Facility: HOSPICE | Age: OVER 89
End: 2025-04-15
Payer: MEDICARE

## 2025-04-15 VITALS
DIASTOLIC BLOOD PRESSURE: 78 MMHG | RESPIRATION RATE: 18 BRPM | SYSTOLIC BLOOD PRESSURE: 126 MMHG | TEMPERATURE: 97.3 F | HEART RATE: 99 BPM

## 2025-04-15 PROCEDURE — G0299 HHS/HOSPICE OF RN EA 15 MIN: HCPCS

## 2025-04-15 PROCEDURE — G0156 HHCP-SVS OF AIDE,EA 15 MIN: HCPCS

## 2025-04-16 ENCOUNTER — HOME CARE VISIT (OUTPATIENT)
Dept: HOME HEALTH SERVICES | Facility: HOME HEALTHCARE | Age: OVER 89
End: 2025-04-16
Payer: MEDICARE

## 2025-04-16 ENCOUNTER — HOME CARE VISIT (OUTPATIENT)
Dept: HOME HOSPICE | Facility: HOSPICE | Age: OVER 89
End: 2025-04-16
Payer: MEDICARE

## 2025-04-16 PROCEDURE — 10330064 PAD, HEEL CUSHION ULTRA (1/PR)SKLCRE

## 2025-04-16 PROCEDURE — 10330064 ALIGNER, FOAM BODY SM (8/CS)

## 2025-04-16 PROCEDURE — G0156 HHCP-SVS OF AIDE,EA 15 MIN: HCPCS

## 2025-04-16 PROCEDURE — 10330064 PHD EQUIPMENT HANDLING CHARGE PHD EQUIPM

## 2025-04-17 ENCOUNTER — HOME CARE VISIT (OUTPATIENT)
Dept: HOME HEALTH SERVICES | Facility: HOME HEALTHCARE | Age: OVER 89
End: 2025-04-17
Payer: MEDICARE

## 2025-04-17 PROCEDURE — G0156 HHCP-SVS OF AIDE,EA 15 MIN: HCPCS

## 2025-04-18 ENCOUNTER — HOME CARE VISIT (OUTPATIENT)
Dept: HOME HOSPICE | Facility: HOSPICE | Age: OVER 89
End: 2025-04-18
Payer: MEDICARE

## 2025-04-18 ENCOUNTER — HOME CARE VISIT (OUTPATIENT)
Dept: HOME HEALTH SERVICES | Facility: HOME HEALTHCARE | Age: OVER 89
End: 2025-04-18
Payer: MEDICARE

## 2025-04-18 VITALS
DIASTOLIC BLOOD PRESSURE: 80 MMHG | SYSTOLIC BLOOD PRESSURE: 130 MMHG | HEART RATE: 92 BPM | RESPIRATION RATE: 18 BRPM | TEMPERATURE: 97.6 F

## 2025-04-18 PROCEDURE — G0156 HHCP-SVS OF AIDE,EA 15 MIN: HCPCS

## 2025-04-18 PROCEDURE — G0155 HHCP-SVS OF CSW,EA 15 MIN: HCPCS

## 2025-04-18 PROCEDURE — G0299 HHS/HOSPICE OF RN EA 15 MIN: HCPCS

## 2025-04-18 PROCEDURE — 10330057 MEDICATION, GENERAL

## 2025-04-19 PROCEDURE — 10330064 BAG, URINE DRN (20/CS)        BARD

## 2025-04-19 PROCEDURE — 10330064 FOAM, ADH SIL W/BORDER 4X4" (10/BX 20BX"

## 2025-04-19 PROCEDURE — 10330064 FOAM, ADH SIL W/BORDER SACRAL 7X7" (10/"

## 2025-04-21 ENCOUNTER — HOME CARE VISIT (OUTPATIENT)
Dept: HOME HEALTH SERVICES | Facility: HOME HEALTHCARE | Age: OVER 89
End: 2025-04-21
Payer: MEDICARE

## 2025-04-21 PROCEDURE — G0156 HHCP-SVS OF AIDE,EA 15 MIN: HCPCS

## 2025-04-22 ENCOUNTER — HOME CARE VISIT (OUTPATIENT)
Dept: HOME HEALTH SERVICES | Facility: HOME HEALTHCARE | Age: OVER 89
End: 2025-04-22
Payer: MEDICARE

## 2025-04-22 ENCOUNTER — HOME CARE VISIT (OUTPATIENT)
Dept: HOME HOSPICE | Facility: HOSPICE | Age: OVER 89
End: 2025-04-22
Payer: MEDICARE

## 2025-04-22 VITALS
HEART RATE: 88 BPM | RESPIRATION RATE: 18 BRPM | SYSTOLIC BLOOD PRESSURE: 103 MMHG | DIASTOLIC BLOOD PRESSURE: 75 MMHG | TEMPERATURE: 97.4 F

## 2025-04-22 PROCEDURE — G0156 HHCP-SVS OF AIDE,EA 15 MIN: HCPCS

## 2025-04-22 PROCEDURE — G0299 HHS/HOSPICE OF RN EA 15 MIN: HCPCS

## 2025-04-22 PROCEDURE — 10330057 MEDICATION, GENERAL

## 2025-04-22 PROCEDURE — 10330064 WIPE, SKIN GEL PROT DRSNG (50/BX)

## 2025-04-22 PROCEDURE — 10330064 CATH SECURE, STATLOCK FOLEY SWIVEL SIL P

## 2025-04-23 ENCOUNTER — HOME CARE VISIT (OUTPATIENT)
Dept: HOME HEALTH SERVICES | Facility: HOME HEALTHCARE | Age: OVER 89
End: 2025-04-23
Payer: MEDICARE

## 2025-04-23 PROCEDURE — G0156 HHCP-SVS OF AIDE,EA 15 MIN: HCPCS

## 2025-04-24 ENCOUNTER — HOME CARE VISIT (OUTPATIENT)
Dept: HOME HEALTH SERVICES | Facility: HOME HEALTHCARE | Age: OVER 89
End: 2025-04-24
Payer: MEDICARE

## 2025-04-24 PROCEDURE — G0156 HHCP-SVS OF AIDE,EA 15 MIN: HCPCS

## 2025-04-25 ENCOUNTER — HOME CARE VISIT (OUTPATIENT)
Dept: HOME HOSPICE | Facility: HOSPICE | Age: OVER 89
End: 2025-04-25
Payer: MEDICARE

## 2025-04-25 ENCOUNTER — HOME CARE VISIT (OUTPATIENT)
Dept: HOME HEALTH SERVICES | Facility: HOME HEALTHCARE | Age: OVER 89
End: 2025-04-25
Payer: MEDICARE

## 2025-04-25 VITALS
TEMPERATURE: 98.3 F | DIASTOLIC BLOOD PRESSURE: 75 MMHG | HEART RATE: 99 BPM | RESPIRATION RATE: 16 BRPM | SYSTOLIC BLOOD PRESSURE: 129 MMHG

## 2025-04-25 PROCEDURE — 10330057 MEDICATION, GENERAL

## 2025-04-25 PROCEDURE — G0299 HHS/HOSPICE OF RN EA 15 MIN: HCPCS

## 2025-04-26 ENCOUNTER — HOME CARE VISIT (OUTPATIENT)
Dept: HOME HEALTH SERVICES | Facility: HOME HEALTHCARE | Age: OVER 89
End: 2025-04-26
Payer: MEDICARE

## 2025-04-26 PROCEDURE — G0156 HHCP-SVS OF AIDE,EA 15 MIN: HCPCS

## 2025-04-27 ENCOUNTER — HOME CARE VISIT (OUTPATIENT)
Dept: HOME HEALTH SERVICES | Facility: HOME HEALTHCARE | Age: OVER 89
End: 2025-04-27
Payer: MEDICARE

## 2025-04-27 PROCEDURE — G0156 HHCP-SVS OF AIDE,EA 15 MIN: HCPCS

## 2025-04-28 ENCOUNTER — HOME CARE VISIT (OUTPATIENT)
Dept: HOME HEALTH SERVICES | Facility: HOME HEALTHCARE | Age: OVER 89
End: 2025-04-28
Payer: MEDICARE

## 2025-04-28 PROCEDURE — G0156 HHCP-SVS OF AIDE,EA 15 MIN: HCPCS

## 2025-04-29 ENCOUNTER — HOME CARE VISIT (OUTPATIENT)
Dept: HOME HEALTH SERVICES | Facility: HOME HEALTHCARE | Age: OVER 89
End: 2025-04-29
Payer: MEDICARE

## 2025-04-29 VITALS
RESPIRATION RATE: 17 BRPM | SYSTOLIC BLOOD PRESSURE: 111 MMHG | DIASTOLIC BLOOD PRESSURE: 77 MMHG | TEMPERATURE: 97.4 F | HEART RATE: 96 BPM

## 2025-04-29 PROCEDURE — G0156 HHCP-SVS OF AIDE,EA 15 MIN: HCPCS

## 2025-04-29 PROCEDURE — G0299 HHS/HOSPICE OF RN EA 15 MIN: HCPCS

## 2025-04-30 ENCOUNTER — HOME CARE VISIT (OUTPATIENT)
Dept: HOME HEALTH SERVICES | Facility: HOME HEALTHCARE | Age: OVER 89
End: 2025-04-30
Payer: MEDICARE

## 2025-04-30 PROCEDURE — G0156 HHCP-SVS OF AIDE,EA 15 MIN: HCPCS

## 2025-05-01 ENCOUNTER — HOME CARE VISIT (OUTPATIENT)
Dept: HOME HEALTH SERVICES | Facility: HOME HEALTHCARE | Age: OVER 89
End: 2025-05-01
Payer: MEDICARE

## 2025-05-01 PROCEDURE — G0156 HHCP-SVS OF AIDE,EA 15 MIN: HCPCS

## 2025-05-02 ENCOUNTER — HOME CARE VISIT (OUTPATIENT)
Dept: HOME HEALTH SERVICES | Facility: HOME HEALTHCARE | Age: OVER 89
End: 2025-05-02
Payer: MEDICARE

## 2025-05-02 ENCOUNTER — HOME CARE VISIT (OUTPATIENT)
Dept: HOME HOSPICE | Facility: HOSPICE | Age: OVER 89
End: 2025-05-02
Payer: MEDICARE

## 2025-05-02 VITALS
HEART RATE: 100 BPM | DIASTOLIC BLOOD PRESSURE: 79 MMHG | TEMPERATURE: 97.5 F | RESPIRATION RATE: 18 BRPM | SYSTOLIC BLOOD PRESSURE: 108 MMHG

## 2025-05-02 PROCEDURE — G0156 HHCP-SVS OF AIDE,EA 15 MIN: HCPCS

## 2025-05-02 PROCEDURE — G0299 HHS/HOSPICE OF RN EA 15 MIN: HCPCS

## 2025-05-03 ENCOUNTER — HOME CARE VISIT (OUTPATIENT)
Dept: HOME HEALTH SERVICES | Facility: HOME HEALTHCARE | Age: OVER 89
End: 2025-05-03
Payer: MEDICARE

## 2025-05-03 PROCEDURE — G0156 HHCP-SVS OF AIDE,EA 15 MIN: HCPCS

## 2025-05-04 ENCOUNTER — HOME CARE VISIT (OUTPATIENT)
Dept: HOME HEALTH SERVICES | Facility: HOME HEALTHCARE | Age: OVER 89
End: 2025-05-04
Payer: MEDICARE

## 2025-05-04 PROCEDURE — G0156 HHCP-SVS OF AIDE,EA 15 MIN: HCPCS

## 2025-05-05 ENCOUNTER — HOME CARE VISIT (OUTPATIENT)
Dept: HOME HEALTH SERVICES | Facility: HOME HEALTHCARE | Age: OVER 89
End: 2025-05-05
Payer: MEDICARE

## 2025-05-05 PROCEDURE — G0156 HHCP-SVS OF AIDE,EA 15 MIN: HCPCS

## 2025-05-06 ENCOUNTER — HOME CARE VISIT (OUTPATIENT)
Dept: HOME HEALTH SERVICES | Facility: HOME HEALTHCARE | Age: OVER 89
End: 2025-05-06
Payer: MEDICARE

## 2025-05-06 ENCOUNTER — HOME CARE VISIT (OUTPATIENT)
Dept: HOME HOSPICE | Facility: HOSPICE | Age: OVER 89
End: 2025-05-06
Payer: MEDICARE

## 2025-05-06 VITALS
TEMPERATURE: 96.6 F | RESPIRATION RATE: 18 BRPM | HEART RATE: 94 BPM | SYSTOLIC BLOOD PRESSURE: 121 MMHG | DIASTOLIC BLOOD PRESSURE: 78 MMHG

## 2025-05-06 PROCEDURE — G0156 HHCP-SVS OF AIDE,EA 15 MIN: HCPCS

## 2025-05-06 PROCEDURE — G0299 HHS/HOSPICE OF RN EA 15 MIN: HCPCS

## 2025-05-06 PROCEDURE — G0155 HHCP-SVS OF CSW,EA 15 MIN: HCPCS

## 2025-05-07 ENCOUNTER — HOME CARE VISIT (OUTPATIENT)
Dept: HOME HEALTH SERVICES | Facility: HOME HEALTHCARE | Age: OVER 89
End: 2025-05-07
Payer: MEDICARE

## 2025-05-07 PROCEDURE — G0156 HHCP-SVS OF AIDE,EA 15 MIN: HCPCS

## 2025-05-08 ENCOUNTER — HOME CARE VISIT (OUTPATIENT)
Dept: HOME HEALTH SERVICES | Facility: HOME HEALTHCARE | Age: OVER 89
End: 2025-05-08
Payer: MEDICARE

## 2025-05-08 PROCEDURE — G0156 HHCP-SVS OF AIDE,EA 15 MIN: HCPCS

## 2025-05-09 ENCOUNTER — HOME CARE VISIT (OUTPATIENT)
Dept: HOME HEALTH SERVICES | Facility: HOME HEALTHCARE | Age: OVER 89
End: 2025-05-09
Payer: MEDICARE

## 2025-05-09 VITALS
TEMPERATURE: 96.9 F | DIASTOLIC BLOOD PRESSURE: 76 MMHG | HEART RATE: 88 BPM | SYSTOLIC BLOOD PRESSURE: 117 MMHG | RESPIRATION RATE: 18 BRPM

## 2025-05-09 PROCEDURE — G0299 HHS/HOSPICE OF RN EA 15 MIN: HCPCS

## 2025-05-09 PROCEDURE — G0156 HHCP-SVS OF AIDE,EA 15 MIN: HCPCS

## 2025-05-10 ENCOUNTER — HOME CARE VISIT (OUTPATIENT)
Dept: HOME HEALTH SERVICES | Facility: HOME HEALTHCARE | Age: OVER 89
End: 2025-05-10
Payer: MEDICARE

## 2025-05-10 PROCEDURE — G0156 HHCP-SVS OF AIDE,EA 15 MIN: HCPCS

## 2025-05-12 ENCOUNTER — HOME CARE VISIT (OUTPATIENT)
Dept: HOME HEALTH SERVICES | Facility: HOME HEALTHCARE | Age: OVER 89
End: 2025-05-12
Payer: MEDICARE

## 2025-05-12 PROCEDURE — G0156 HHCP-SVS OF AIDE,EA 15 MIN: HCPCS

## 2025-05-13 ENCOUNTER — HOME CARE VISIT (OUTPATIENT)
Dept: HOME HEALTH SERVICES | Facility: HOME HEALTHCARE | Age: OVER 89
End: 2025-05-13
Payer: MEDICARE

## 2025-05-13 ENCOUNTER — HOME CARE VISIT (OUTPATIENT)
Dept: HOME HOSPICE | Facility: HOSPICE | Age: OVER 89
End: 2025-05-13
Payer: MEDICARE

## 2025-05-13 PROCEDURE — G0156 HHCP-SVS OF AIDE,EA 15 MIN: HCPCS

## 2025-05-13 PROCEDURE — G0299 HHS/HOSPICE OF RN EA 15 MIN: HCPCS

## 2025-05-14 ENCOUNTER — HOME CARE VISIT (OUTPATIENT)
Dept: HOME HEALTH SERVICES | Facility: HOME HEALTHCARE | Age: OVER 89
End: 2025-05-14
Payer: MEDICARE

## 2025-05-14 PROCEDURE — G0156 HHCP-SVS OF AIDE,EA 15 MIN: HCPCS

## 2025-05-15 ENCOUNTER — HOME CARE VISIT (OUTPATIENT)
Dept: HOME HEALTH SERVICES | Facility: HOME HEALTHCARE | Age: OVER 89
End: 2025-05-15
Payer: MEDICARE

## 2025-05-15 PROCEDURE — G0156 HHCP-SVS OF AIDE,EA 15 MIN: HCPCS

## 2025-05-16 ENCOUNTER — HOME CARE VISIT (OUTPATIENT)
Dept: HOME HOSPICE | Facility: HOSPICE | Age: OVER 89
End: 2025-05-16
Payer: MEDICARE

## 2025-05-16 ENCOUNTER — HOME CARE VISIT (OUTPATIENT)
Dept: HOME HEALTH SERVICES | Facility: HOME HEALTHCARE | Age: OVER 89
End: 2025-05-16
Payer: MEDICARE

## 2025-05-16 VITALS
TEMPERATURE: 97.4 F | DIASTOLIC BLOOD PRESSURE: 80 MMHG | RESPIRATION RATE: 18 BRPM | SYSTOLIC BLOOD PRESSURE: 114 MMHG | HEART RATE: 100 BPM

## 2025-05-16 PROCEDURE — G0156 HHCP-SVS OF AIDE,EA 15 MIN: HCPCS

## 2025-05-16 PROCEDURE — G0299 HHS/HOSPICE OF RN EA 15 MIN: HCPCS

## 2025-05-19 ENCOUNTER — HOME CARE VISIT (OUTPATIENT)
Dept: HOME HEALTH SERVICES | Facility: HOME HEALTHCARE | Age: OVER 89
End: 2025-05-19
Payer: MEDICARE

## 2025-05-19 PROCEDURE — G0156 HHCP-SVS OF AIDE,EA 15 MIN: HCPCS

## 2025-05-20 ENCOUNTER — HOME CARE VISIT (OUTPATIENT)
Dept: HOME HEALTH SERVICES | Facility: HOME HEALTHCARE | Age: OVER 89
End: 2025-05-20
Payer: MEDICARE

## 2025-05-20 VITALS
DIASTOLIC BLOOD PRESSURE: 82 MMHG | TEMPERATURE: 97.4 F | SYSTOLIC BLOOD PRESSURE: 112 MMHG | HEART RATE: 105 BPM | RESPIRATION RATE: 18 BRPM

## 2025-05-20 PROCEDURE — G0156 HHCP-SVS OF AIDE,EA 15 MIN: HCPCS

## 2025-05-20 PROCEDURE — G0299 HHS/HOSPICE OF RN EA 15 MIN: HCPCS

## 2025-05-21 ENCOUNTER — HOME CARE VISIT (OUTPATIENT)
Dept: HOME HEALTH SERVICES | Facility: HOME HEALTHCARE | Age: OVER 89
End: 2025-05-21
Payer: MEDICARE

## 2025-05-21 PROCEDURE — G0156 HHCP-SVS OF AIDE,EA 15 MIN: HCPCS

## 2025-05-22 ENCOUNTER — HOME CARE VISIT (OUTPATIENT)
Dept: HOME HEALTH SERVICES | Facility: HOME HEALTHCARE | Age: OVER 89
End: 2025-05-22
Payer: MEDICARE

## 2025-05-22 VITALS
TEMPERATURE: 97.6 F | SYSTOLIC BLOOD PRESSURE: 101 MMHG | RESPIRATION RATE: 18 BRPM | DIASTOLIC BLOOD PRESSURE: 61 MMHG | HEART RATE: 93 BPM

## 2025-05-22 PROCEDURE — G0299 HHS/HOSPICE OF RN EA 15 MIN: HCPCS

## 2025-05-22 PROCEDURE — G0156 HHCP-SVS OF AIDE,EA 15 MIN: HCPCS

## 2025-05-24 ENCOUNTER — HOME CARE VISIT (OUTPATIENT)
Dept: HOME HOSPICE | Facility: HOSPICE | Age: OVER 89
End: 2025-05-24
Payer: MEDICARE

## 2025-05-25 ENCOUNTER — HOME CARE VISIT (OUTPATIENT)
Dept: HOME HEALTH SERVICES | Facility: HOME HEALTHCARE | Age: OVER 89
End: 2025-05-25
Payer: MEDICARE

## 2025-05-25 ENCOUNTER — HOME CARE VISIT (OUTPATIENT)
Dept: HOME HOSPICE | Facility: HOSPICE | Age: OVER 89
End: 2025-05-25
Payer: MEDICARE

## 2025-05-25 PROCEDURE — G0156 HHCP-SVS OF AIDE,EA 15 MIN: HCPCS

## 2025-05-27 ENCOUNTER — HOME CARE VISIT (OUTPATIENT)
Dept: HOME HOSPICE | Facility: HOSPICE | Age: OVER 89
End: 2025-05-27
Payer: MEDICARE

## 2025-05-27 ENCOUNTER — HOME CARE VISIT (OUTPATIENT)
Dept: HOME HEALTH SERVICES | Facility: HOME HEALTHCARE | Age: OVER 89
End: 2025-05-27
Payer: MEDICARE

## 2025-05-27 VITALS
SYSTOLIC BLOOD PRESSURE: 122 MMHG | TEMPERATURE: 97.3 F | RESPIRATION RATE: 18 BRPM | DIASTOLIC BLOOD PRESSURE: 99 MMHG | HEART RATE: 101 BPM

## 2025-05-27 PROCEDURE — G0299 HHS/HOSPICE OF RN EA 15 MIN: HCPCS

## 2025-05-28 ENCOUNTER — HOME CARE VISIT (OUTPATIENT)
Dept: HOME HEALTH SERVICES | Facility: HOME HEALTHCARE | Age: OVER 89
End: 2025-05-28
Payer: MEDICARE

## 2025-05-28 PROCEDURE — G0156 HHCP-SVS OF AIDE,EA 15 MIN: HCPCS

## 2025-05-29 ENCOUNTER — HOME CARE VISIT (OUTPATIENT)
Dept: HOME HEALTH SERVICES | Facility: HOME HEALTHCARE | Age: OVER 89
End: 2025-05-29
Payer: MEDICARE

## 2025-05-29 PROCEDURE — G0156 HHCP-SVS OF AIDE,EA 15 MIN: HCPCS

## 2025-05-30 ENCOUNTER — HOME CARE VISIT (OUTPATIENT)
Dept: HOME HEALTH SERVICES | Facility: HOME HEALTHCARE | Age: OVER 89
End: 2025-05-30
Payer: MEDICARE

## 2025-05-30 VITALS
DIASTOLIC BLOOD PRESSURE: 69 MMHG | HEART RATE: 87 BPM | RESPIRATION RATE: 17 BRPM | TEMPERATURE: 97.5 F | SYSTOLIC BLOOD PRESSURE: 107 MMHG

## 2025-05-30 PROCEDURE — G0155 HHCP-SVS OF CSW,EA 15 MIN: HCPCS

## 2025-05-30 PROCEDURE — G0299 HHS/HOSPICE OF RN EA 15 MIN: HCPCS

## 2025-05-30 PROCEDURE — G0156 HHCP-SVS OF AIDE,EA 15 MIN: HCPCS

## 2025-05-31 ENCOUNTER — HOME CARE VISIT (OUTPATIENT)
Dept: HOME HEALTH SERVICES | Facility: HOME HEALTHCARE | Age: OVER 89
End: 2025-05-31
Payer: MEDICARE

## 2025-05-31 PROCEDURE — G0156 HHCP-SVS OF AIDE,EA 15 MIN: HCPCS

## 2025-06-01 ENCOUNTER — HOME CARE VISIT (OUTPATIENT)
Dept: HOME HEALTH SERVICES | Facility: HOME HEALTHCARE | Age: OVER 89
End: 2025-06-01
Payer: MEDICARE

## 2025-06-01 PROCEDURE — G0156 HHCP-SVS OF AIDE,EA 15 MIN: HCPCS

## 2025-06-02 ENCOUNTER — HOME CARE VISIT (OUTPATIENT)
Dept: HOME HEALTH SERVICES | Facility: HOME HEALTHCARE | Age: OVER 89
End: 2025-06-02
Payer: MEDICARE

## 2025-06-02 PROCEDURE — G0156 HHCP-SVS OF AIDE,EA 15 MIN: HCPCS

## 2025-06-03 ENCOUNTER — HOME CARE VISIT (OUTPATIENT)
Dept: HOME HEALTH SERVICES | Facility: HOME HEALTHCARE | Age: OVER 89
End: 2025-06-03
Payer: MEDICARE

## 2025-06-03 VITALS
HEART RATE: 88 BPM | SYSTOLIC BLOOD PRESSURE: 104 MMHG | RESPIRATION RATE: 18 BRPM | DIASTOLIC BLOOD PRESSURE: 70 MMHG | TEMPERATURE: 97.5 F

## 2025-06-03 PROCEDURE — G0299 HHS/HOSPICE OF RN EA 15 MIN: HCPCS

## 2025-06-03 PROCEDURE — G0156 HHCP-SVS OF AIDE,EA 15 MIN: HCPCS

## 2025-06-04 ENCOUNTER — HOME CARE VISIT (OUTPATIENT)
Dept: HOME HEALTH SERVICES | Facility: HOME HEALTHCARE | Age: OVER 89
End: 2025-06-04
Payer: MEDICARE

## 2025-06-04 PROCEDURE — G0156 HHCP-SVS OF AIDE,EA 15 MIN: HCPCS

## 2025-06-05 ENCOUNTER — HOME CARE VISIT (OUTPATIENT)
Dept: HOME HEALTH SERVICES | Facility: HOME HEALTHCARE | Age: OVER 89
End: 2025-06-05
Payer: MEDICARE

## 2025-06-05 VITALS
TEMPERATURE: 97.8 F | HEART RATE: 90 BPM | DIASTOLIC BLOOD PRESSURE: 63 MMHG | SYSTOLIC BLOOD PRESSURE: 91 MMHG | RESPIRATION RATE: 19 BRPM

## 2025-06-05 PROCEDURE — G0156 HHCP-SVS OF AIDE,EA 15 MIN: HCPCS

## 2025-06-05 PROCEDURE — G0299 HHS/HOSPICE OF RN EA 15 MIN: HCPCS

## 2025-06-07 ENCOUNTER — HOME CARE VISIT (OUTPATIENT)
Dept: HOME HOSPICE | Facility: HOSPICE | Age: OVER 89
End: 2025-06-07
Payer: MEDICARE

## 2025-06-09 ENCOUNTER — HOME CARE VISIT (OUTPATIENT)
Dept: HOME HOSPICE | Facility: HOSPICE | Age: OVER 89
End: 2025-06-09
Payer: MEDICARE

## 2025-06-10 ENCOUNTER — HOME CARE VISIT (OUTPATIENT)
Dept: HOME HOSPICE | Facility: HOSPICE | Age: OVER 89
End: 2025-06-10
Payer: MEDICARE

## 2025-06-10 ENCOUNTER — HOME CARE VISIT (OUTPATIENT)
Dept: HOME HEALTH SERVICES | Facility: HOME HEALTHCARE | Age: OVER 89
End: 2025-06-10
Payer: MEDICARE

## 2025-06-10 VITALS
SYSTOLIC BLOOD PRESSURE: 120 MMHG | HEART RATE: 117 BPM | TEMPERATURE: 97.6 F | DIASTOLIC BLOOD PRESSURE: 91 MMHG | RESPIRATION RATE: 18 BRPM

## 2025-06-10 PROCEDURE — G0156 HHCP-SVS OF AIDE,EA 15 MIN: HCPCS

## 2025-06-10 PROCEDURE — G0299 HHS/HOSPICE OF RN EA 15 MIN: HCPCS

## 2025-06-11 ENCOUNTER — HOME CARE VISIT (OUTPATIENT)
Dept: HOME HEALTH SERVICES | Facility: HOME HEALTHCARE | Age: OVER 89
End: 2025-06-11
Payer: MEDICARE

## 2025-06-11 PROCEDURE — G0156 HHCP-SVS OF AIDE,EA 15 MIN: HCPCS

## 2025-06-13 ENCOUNTER — HOME CARE VISIT (OUTPATIENT)
Dept: HOME HOSPICE | Facility: HOSPICE | Age: OVER 89
End: 2025-06-13
Payer: MEDICARE

## 2025-06-13 ENCOUNTER — HOME CARE VISIT (OUTPATIENT)
Dept: HOME HEALTH SERVICES | Facility: HOME HEALTHCARE | Age: OVER 89
End: 2025-06-13
Payer: MEDICARE

## 2025-06-13 VITALS
SYSTOLIC BLOOD PRESSURE: 110 MMHG | RESPIRATION RATE: 18 BRPM | DIASTOLIC BLOOD PRESSURE: 79 MMHG | TEMPERATURE: 96.6 F | HEART RATE: 55 BPM

## 2025-06-13 PROCEDURE — G0156 HHCP-SVS OF AIDE,EA 15 MIN: HCPCS

## 2025-06-13 PROCEDURE — G0299 HHS/HOSPICE OF RN EA 15 MIN: HCPCS

## 2025-06-14 ENCOUNTER — HOME CARE VISIT (OUTPATIENT)
Dept: HOME HOSPICE | Facility: HOSPICE | Age: OVER 89
End: 2025-06-14
Payer: MEDICARE

## 2025-06-15 ENCOUNTER — HOME CARE VISIT (OUTPATIENT)
Dept: HOME HEALTH SERVICES | Facility: HOME HEALTHCARE | Age: OVER 89
End: 2025-06-15
Payer: MEDICARE

## 2025-06-15 PROCEDURE — G0156 HHCP-SVS OF AIDE,EA 15 MIN: HCPCS

## 2025-06-16 ENCOUNTER — HOME CARE VISIT (OUTPATIENT)
Dept: HOME HEALTH SERVICES | Facility: HOME HEALTHCARE | Age: OVER 89
End: 2025-06-16
Payer: MEDICARE

## 2025-06-16 VITALS
RESPIRATION RATE: 18 BRPM | TEMPERATURE: 97.3 F | HEART RATE: 100 BPM | DIASTOLIC BLOOD PRESSURE: 71 MMHG | SYSTOLIC BLOOD PRESSURE: 103 MMHG

## 2025-06-16 PROCEDURE — G0299 HHS/HOSPICE OF RN EA 15 MIN: HCPCS

## 2025-06-17 ENCOUNTER — HOME CARE VISIT (OUTPATIENT)
Dept: HOME HEALTH SERVICES | Facility: HOME HEALTHCARE | Age: OVER 89
End: 2025-06-17
Payer: MEDICARE

## 2025-06-17 PROCEDURE — G0156 HHCP-SVS OF AIDE,EA 15 MIN: HCPCS

## 2025-06-18 ENCOUNTER — HOME CARE VISIT (OUTPATIENT)
Dept: HOME HEALTH SERVICES | Facility: HOME HEALTHCARE | Age: OVER 89
End: 2025-06-18
Payer: MEDICARE

## 2025-06-18 PROCEDURE — G0156 HHCP-SVS OF AIDE,EA 15 MIN: HCPCS

## 2025-06-19 ENCOUNTER — HOME CARE VISIT (OUTPATIENT)
Dept: HOME HEALTH SERVICES | Facility: HOME HEALTHCARE | Age: OVER 89
End: 2025-06-19
Payer: MEDICARE

## 2025-06-19 PROCEDURE — G0156 HHCP-SVS OF AIDE,EA 15 MIN: HCPCS

## 2025-06-20 ENCOUNTER — HOME CARE VISIT (OUTPATIENT)
Dept: HOME HEALTH SERVICES | Facility: HOME HEALTHCARE | Age: OVER 89
End: 2025-06-20
Payer: MEDICARE

## 2025-06-20 ENCOUNTER — HOME CARE VISIT (OUTPATIENT)
Dept: HOME HOSPICE | Facility: HOSPICE | Age: OVER 89
End: 2025-06-20
Payer: MEDICARE

## 2025-06-20 VITALS
TEMPERATURE: 97.8 F | RESPIRATION RATE: 17 BRPM | DIASTOLIC BLOOD PRESSURE: 55 MMHG | SYSTOLIC BLOOD PRESSURE: 104 MMHG | HEART RATE: 80 BPM

## 2025-06-20 PROCEDURE — G0156 HHCP-SVS OF AIDE,EA 15 MIN: HCPCS

## 2025-06-20 PROCEDURE — G0299 HHS/HOSPICE OF RN EA 15 MIN: HCPCS

## 2025-06-20 PROCEDURE — G0155 HHCP-SVS OF CSW,EA 15 MIN: HCPCS

## 2025-06-21 ENCOUNTER — HOME CARE VISIT (OUTPATIENT)
Dept: HOME HEALTH SERVICES | Facility: HOME HEALTHCARE | Age: OVER 89
End: 2025-06-21
Payer: MEDICARE

## 2025-06-21 PROCEDURE — G0156 HHCP-SVS OF AIDE,EA 15 MIN: HCPCS

## 2025-06-23 ENCOUNTER — HOME CARE VISIT (OUTPATIENT)
Dept: HOME HEALTH SERVICES | Facility: HOME HEALTHCARE | Age: OVER 89
End: 2025-06-23
Payer: MEDICARE

## 2025-06-23 PROCEDURE — G0156 HHCP-SVS OF AIDE,EA 15 MIN: HCPCS

## 2025-06-24 ENCOUNTER — HOME CARE VISIT (OUTPATIENT)
Dept: HOME HEALTH SERVICES | Facility: HOME HEALTHCARE | Age: OVER 89
End: 2025-06-24
Payer: MEDICARE

## 2025-06-24 VITALS
HEART RATE: 78 BPM | TEMPERATURE: 97.3 F | DIASTOLIC BLOOD PRESSURE: 71 MMHG | SYSTOLIC BLOOD PRESSURE: 115 MMHG | RESPIRATION RATE: 18 BRPM

## 2025-06-24 PROCEDURE — G0299 HHS/HOSPICE OF RN EA 15 MIN: HCPCS

## 2025-06-24 PROCEDURE — G0156 HHCP-SVS OF AIDE,EA 15 MIN: HCPCS

## 2025-06-25 ENCOUNTER — HOME CARE VISIT (OUTPATIENT)
Dept: HOME HEALTH SERVICES | Facility: HOME HEALTHCARE | Age: OVER 89
End: 2025-06-25
Payer: MEDICARE

## 2025-06-25 PROCEDURE — G0156 HHCP-SVS OF AIDE,EA 15 MIN: HCPCS

## 2025-06-26 ENCOUNTER — HOME CARE VISIT (OUTPATIENT)
Dept: HOME HEALTH SERVICES | Facility: HOME HEALTHCARE | Age: OVER 89
End: 2025-06-26
Payer: MEDICARE

## 2025-06-26 PROCEDURE — G0156 HHCP-SVS OF AIDE,EA 15 MIN: HCPCS

## 2025-06-27 ENCOUNTER — HOME CARE VISIT (OUTPATIENT)
Dept: HOME HEALTH SERVICES | Facility: HOME HEALTHCARE | Age: OVER 89
End: 2025-06-27
Payer: MEDICARE

## 2025-06-27 ENCOUNTER — HOME CARE VISIT (OUTPATIENT)
Dept: HOME HOSPICE | Facility: HOSPICE | Age: OVER 89
End: 2025-06-27
Payer: MEDICARE

## 2025-06-27 VITALS
HEART RATE: 104 BPM | TEMPERATURE: 97.5 F | SYSTOLIC BLOOD PRESSURE: 95 MMHG | RESPIRATION RATE: 18 BRPM | DIASTOLIC BLOOD PRESSURE: 65 MMHG

## 2025-06-27 PROCEDURE — G0299 HHS/HOSPICE OF RN EA 15 MIN: HCPCS

## 2025-06-30 ENCOUNTER — HOME CARE VISIT (OUTPATIENT)
Dept: HOME HEALTH SERVICES | Facility: HOME HEALTHCARE | Age: OVER 89
End: 2025-06-30
Payer: MEDICARE

## 2025-06-30 PROCEDURE — G0156 HHCP-SVS OF AIDE,EA 15 MIN: HCPCS

## 2025-07-01 ENCOUNTER — HOME CARE VISIT (OUTPATIENT)
Dept: HOME HEALTH SERVICES | Facility: HOME HEALTHCARE | Age: OVER 89
End: 2025-07-01
Payer: MEDICARE

## 2025-07-01 ENCOUNTER — HOME CARE VISIT (OUTPATIENT)
Dept: HOME HOSPICE | Facility: HOSPICE | Age: OVER 89
End: 2025-07-01
Payer: MEDICARE

## 2025-07-01 VITALS
RESPIRATION RATE: 18 BRPM | DIASTOLIC BLOOD PRESSURE: 65 MMHG | TEMPERATURE: 97.5 F | SYSTOLIC BLOOD PRESSURE: 96 MMHG | HEART RATE: 87 BPM

## 2025-07-01 PROCEDURE — G0299 HHS/HOSPICE OF RN EA 15 MIN: HCPCS

## 2025-07-01 PROCEDURE — G0155 HHCP-SVS OF CSW,EA 15 MIN: HCPCS

## 2025-07-01 PROCEDURE — G0156 HHCP-SVS OF AIDE,EA 15 MIN: HCPCS

## 2025-07-02 ENCOUNTER — HOME CARE VISIT (OUTPATIENT)
Dept: HOME HEALTH SERVICES | Facility: HOME HEALTHCARE | Age: OVER 89
End: 2025-07-02
Payer: MEDICARE

## 2025-07-02 PROCEDURE — G0156 HHCP-SVS OF AIDE,EA 15 MIN: HCPCS

## 2025-07-03 ENCOUNTER — HOME CARE VISIT (OUTPATIENT)
Dept: HOME HEALTH SERVICES | Facility: HOME HEALTHCARE | Age: OVER 89
End: 2025-07-03
Payer: MEDICARE

## 2025-07-03 VITALS
DIASTOLIC BLOOD PRESSURE: 64 MMHG | SYSTOLIC BLOOD PRESSURE: 91 MMHG | RESPIRATION RATE: 18 BRPM | HEART RATE: 90 BPM | TEMPERATURE: 97.7 F

## 2025-07-03 PROCEDURE — G0299 HHS/HOSPICE OF RN EA 15 MIN: HCPCS

## 2025-07-03 PROCEDURE — G0156 HHCP-SVS OF AIDE,EA 15 MIN: HCPCS

## 2025-07-05 ENCOUNTER — HOME CARE VISIT (OUTPATIENT)
Dept: HOME HOSPICE | Facility: HOSPICE | Age: OVER 89
End: 2025-07-05
Payer: MEDICARE

## 2025-07-06 ENCOUNTER — HOME CARE VISIT (OUTPATIENT)
Dept: HOME HEALTH SERVICES | Facility: HOME HEALTHCARE | Age: OVER 89
End: 2025-07-06
Payer: MEDICARE

## 2025-07-06 ENCOUNTER — HOME CARE VISIT (OUTPATIENT)
Dept: HOME HOSPICE | Facility: HOSPICE | Age: OVER 89
End: 2025-07-06
Payer: MEDICARE

## 2025-07-06 PROCEDURE — G0156 HHCP-SVS OF AIDE,EA 15 MIN: HCPCS

## 2025-07-08 ENCOUNTER — HOME CARE VISIT (OUTPATIENT)
Dept: HOME HEALTH SERVICES | Facility: HOME HEALTHCARE | Age: OVER 89
End: 2025-07-08
Payer: MEDICARE

## 2025-07-08 ENCOUNTER — HOME CARE VISIT (OUTPATIENT)
Dept: HOME HOSPICE | Facility: HOSPICE | Age: OVER 89
End: 2025-07-08
Payer: MEDICARE

## 2025-07-08 VITALS
RESPIRATION RATE: 18 BRPM | TEMPERATURE: 97.4 F | DIASTOLIC BLOOD PRESSURE: 73 MMHG | SYSTOLIC BLOOD PRESSURE: 102 MMHG | HEART RATE: 89 BPM

## 2025-07-08 PROCEDURE — G0299 HHS/HOSPICE OF RN EA 15 MIN: HCPCS

## 2025-07-08 PROCEDURE — G0156 HHCP-SVS OF AIDE,EA 15 MIN: HCPCS

## 2025-07-09 ENCOUNTER — HOME CARE VISIT (OUTPATIENT)
Dept: HOME HEALTH SERVICES | Facility: HOME HEALTHCARE | Age: OVER 89
End: 2025-07-09
Payer: MEDICARE

## 2025-07-09 PROCEDURE — G0156 HHCP-SVS OF AIDE,EA 15 MIN: HCPCS

## 2025-07-10 ENCOUNTER — HOME CARE VISIT (OUTPATIENT)
Dept: HOME HEALTH SERVICES | Facility: HOME HEALTHCARE | Age: OVER 89
End: 2025-07-10
Payer: MEDICARE

## 2025-07-10 PROCEDURE — G0156 HHCP-SVS OF AIDE,EA 15 MIN: HCPCS

## 2025-07-11 ENCOUNTER — HOME CARE VISIT (OUTPATIENT)
Dept: HOME HEALTH SERVICES | Facility: HOME HEALTHCARE | Age: OVER 89
End: 2025-07-11
Payer: MEDICARE

## 2025-07-11 VITALS
DIASTOLIC BLOOD PRESSURE: 65 MMHG | HEART RATE: 80 BPM | TEMPERATURE: 97.4 F | RESPIRATION RATE: 18 BRPM | SYSTOLIC BLOOD PRESSURE: 100 MMHG

## 2025-07-11 PROCEDURE — G0299 HHS/HOSPICE OF RN EA 15 MIN: HCPCS

## 2025-07-11 PROCEDURE — G0156 HHCP-SVS OF AIDE,EA 15 MIN: HCPCS

## 2025-07-13 ENCOUNTER — HOME CARE VISIT (OUTPATIENT)
Dept: HOME HEALTH SERVICES | Facility: HOME HEALTHCARE | Age: OVER 89
End: 2025-07-13
Payer: MEDICARE

## 2025-07-13 PROCEDURE — G0156 HHCP-SVS OF AIDE,EA 15 MIN: HCPCS

## 2025-07-14 ENCOUNTER — HOME CARE VISIT (OUTPATIENT)
Dept: HOME HEALTH SERVICES | Facility: HOME HEALTHCARE | Age: OVER 89
End: 2025-07-14
Payer: MEDICARE

## 2025-07-15 ENCOUNTER — HOME CARE VISIT (OUTPATIENT)
Dept: HOME HOSPICE | Facility: HOSPICE | Age: OVER 89
End: 2025-07-15
Payer: MEDICARE

## 2025-07-15 ENCOUNTER — HOME CARE VISIT (OUTPATIENT)
Dept: HOME HEALTH SERVICES | Facility: HOME HEALTHCARE | Age: OVER 89
End: 2025-07-15
Payer: MEDICARE

## 2025-07-15 VITALS
TEMPERATURE: 97.6 F | RESPIRATION RATE: 17 BRPM | HEART RATE: 103 BPM | DIASTOLIC BLOOD PRESSURE: 73 MMHG | SYSTOLIC BLOOD PRESSURE: 105 MMHG

## 2025-07-15 PROCEDURE — G0299 HHS/HOSPICE OF RN EA 15 MIN: HCPCS

## 2025-07-15 PROCEDURE — G0156 HHCP-SVS OF AIDE,EA 15 MIN: HCPCS

## 2025-07-16 ENCOUNTER — HOME CARE VISIT (OUTPATIENT)
Dept: HOME HEALTH SERVICES | Facility: HOME HEALTHCARE | Age: OVER 89
End: 2025-07-16
Payer: MEDICARE

## 2025-07-16 PROCEDURE — G0156 HHCP-SVS OF AIDE,EA 15 MIN: HCPCS

## 2025-07-18 ENCOUNTER — HOME CARE VISIT (OUTPATIENT)
Dept: HOME HEALTH SERVICES | Facility: HOME HEALTHCARE | Age: OVER 89
End: 2025-07-18
Payer: MEDICARE

## 2025-07-18 ENCOUNTER — HOME CARE VISIT (OUTPATIENT)
Dept: HOME HOSPICE | Facility: HOSPICE | Age: OVER 89
End: 2025-07-18
Payer: MEDICARE

## 2025-07-18 VITALS
DIASTOLIC BLOOD PRESSURE: 67 MMHG | TEMPERATURE: 97.6 F | RESPIRATION RATE: 18 BRPM | HEART RATE: 90 BPM | SYSTOLIC BLOOD PRESSURE: 97 MMHG

## 2025-07-18 PROCEDURE — G0299 HHS/HOSPICE OF RN EA 15 MIN: HCPCS

## 2025-07-21 ENCOUNTER — HOME CARE VISIT (OUTPATIENT)
Dept: HOME HEALTH SERVICES | Facility: HOME HEALTHCARE | Age: OVER 89
End: 2025-07-21
Payer: MEDICARE

## 2025-07-21 ENCOUNTER — HOME CARE VISIT (OUTPATIENT)
Dept: HOME HOSPICE | Facility: HOSPICE | Age: OVER 89
End: 2025-07-21
Payer: MEDICARE

## 2025-07-21 PROCEDURE — G0156 HHCP-SVS OF AIDE,EA 15 MIN: HCPCS

## 2025-07-22 ENCOUNTER — HOME CARE VISIT (OUTPATIENT)
Dept: HOME HEALTH SERVICES | Facility: HOME HEALTHCARE | Age: OVER 89
End: 2025-07-22
Payer: MEDICARE

## 2025-07-22 ENCOUNTER — HOME CARE VISIT (OUTPATIENT)
Dept: HOME HOSPICE | Facility: HOSPICE | Age: OVER 89
End: 2025-07-22
Payer: MEDICARE

## 2025-07-22 VITALS
TEMPERATURE: 97.7 F | DIASTOLIC BLOOD PRESSURE: 64 MMHG | HEART RATE: 90 BPM | SYSTOLIC BLOOD PRESSURE: 94 MMHG | RESPIRATION RATE: 17 BRPM

## 2025-07-22 PROCEDURE — G0299 HHS/HOSPICE OF RN EA 15 MIN: HCPCS

## 2025-07-22 PROCEDURE — G0156 HHCP-SVS OF AIDE,EA 15 MIN: HCPCS

## 2025-07-23 ENCOUNTER — HOME CARE VISIT (OUTPATIENT)
Dept: HOME HEALTH SERVICES | Facility: HOME HEALTHCARE | Age: OVER 89
End: 2025-07-23
Payer: MEDICARE

## 2025-07-23 PROCEDURE — G0156 HHCP-SVS OF AIDE,EA 15 MIN: HCPCS

## 2025-07-25 ENCOUNTER — HOME CARE VISIT (OUTPATIENT)
Dept: HOME HOSPICE | Facility: HOSPICE | Age: OVER 89
End: 2025-07-25
Payer: MEDICARE

## 2025-07-25 ENCOUNTER — HOME CARE VISIT (OUTPATIENT)
Dept: HOME HEALTH SERVICES | Facility: HOME HEALTHCARE | Age: OVER 89
End: 2025-07-25
Payer: MEDICARE

## 2025-07-25 VITALS
TEMPERATURE: 97.3 F | RESPIRATION RATE: 18 BRPM | HEART RATE: 102 BPM | SYSTOLIC BLOOD PRESSURE: 93 MMHG | DIASTOLIC BLOOD PRESSURE: 67 MMHG

## 2025-07-25 PROCEDURE — G0155 HHCP-SVS OF CSW,EA 15 MIN: HCPCS

## 2025-07-25 PROCEDURE — G0299 HHS/HOSPICE OF RN EA 15 MIN: HCPCS
